# Patient Record
Sex: MALE | Race: WHITE | NOT HISPANIC OR LATINO | Employment: OTHER | URBAN - METROPOLITAN AREA
[De-identification: names, ages, dates, MRNs, and addresses within clinical notes are randomized per-mention and may not be internally consistent; named-entity substitution may affect disease eponyms.]

---

## 2017-03-29 ENCOUNTER — TELEPHONE (OUTPATIENT)
Dept: PODIATRY | Facility: CLINIC | Age: 42
End: 2017-03-29

## 2017-03-29 NOTE — TELEPHONE ENCOUNTER
Spoke to patient who called to request new prescription for topical antifungal medication. Educated on current prescription for efinaconazole 10% with 11 refills sent in by Dr. Munoz on 12/7/16. Verbalized understanding and ended call pleasantly.

## 2017-03-29 NOTE — TELEPHONE ENCOUNTER
----- Message from Letty Webb sent at 3/29/2017  9:57 AM CDT -----  Contact: pt  Pt states needs a medication refill-for toenail fungus..[phone] (please notify when sent)           .  [facility] [place] [address]. - [place] - [address] [place], [place] [address] [address]  Phone: [phone] Fax: [phone]

## 2017-05-09 ENCOUNTER — OFFICE VISIT (OUTPATIENT)
Dept: INTERNAL MEDICINE | Facility: CLINIC | Age: 42
End: 2017-05-09
Payer: COMMERCIAL

## 2017-05-09 VITALS
DIASTOLIC BLOOD PRESSURE: 74 MMHG | OXYGEN SATURATION: 98 % | BODY MASS INDEX: 36.08 KG/M2 | WEIGHT: 272.25 LBS | TEMPERATURE: 96 F | HEIGHT: 73 IN | HEART RATE: 61 BPM | SYSTOLIC BLOOD PRESSURE: 132 MMHG

## 2017-05-09 DIAGNOSIS — J02.9 PHARYNGITIS, UNSPECIFIED ETIOLOGY: ICD-10-CM

## 2017-05-09 DIAGNOSIS — B35.9 TINEA: Primary | ICD-10-CM

## 2017-05-09 LAB — DEPRECATED S PYO AG THROAT QL EIA: NEGATIVE

## 2017-05-09 PROCEDURE — 99214 OFFICE O/P EST MOD 30 MIN: CPT | Mod: S$GLB,,, | Performed by: PEDIATRICS

## 2017-05-09 PROCEDURE — 87880 STREP A ASSAY W/OPTIC: CPT | Mod: PO

## 2017-05-09 PROCEDURE — 3075F SYST BP GE 130 - 139MM HG: CPT | Mod: S$GLB,,, | Performed by: PEDIATRICS

## 2017-05-09 PROCEDURE — 99999 PR PBB SHADOW E&M-EST. PATIENT-LVL III: CPT | Mod: PBBFAC,,, | Performed by: PEDIATRICS

## 2017-05-09 PROCEDURE — 3078F DIAST BP <80 MM HG: CPT | Mod: S$GLB,,, | Performed by: PEDIATRICS

## 2017-05-09 PROCEDURE — 1160F RVW MEDS BY RX/DR IN RCRD: CPT | Mod: S$GLB,,, | Performed by: PEDIATRICS

## 2017-05-09 PROCEDURE — 87081 CULTURE SCREEN ONLY: CPT

## 2017-05-09 RX ORDER — ECONAZOLE NITRATE 10 MG/G
CREAM TOPICAL 2 TIMES DAILY
Qty: 85 G | Refills: 0 | Status: SHIPPED | OUTPATIENT
Start: 2017-05-09 | End: 2017-08-25

## 2017-05-09 RX ORDER — DESONIDE 0.5 MG/ML
LOTION TOPICAL 2 TIMES DAILY
Qty: 120 ML | Refills: 0 | Status: SHIPPED | OUTPATIENT
Start: 2017-05-09 | End: 2017-07-07 | Stop reason: SDUPTHER

## 2017-05-09 RX ORDER — AZITHROMYCIN 250 MG/1
TABLET, FILM COATED ORAL
Qty: 6 TABLET | Refills: 0 | Status: SHIPPED | OUTPATIENT
Start: 2017-05-09 | End: 2017-08-25 | Stop reason: SDUPTHER

## 2017-05-09 NOTE — MR AVS SNAPSHOT
[facility]  [address] [facility] [place] [address]  Phone: [phone]  Fax: [phone]                  Pradeep Aranda   2017 7:40 AM   Office Visit    Description:  Male : 1975   Provider:  HEAVEN Munoz Jr., MD   Department:  [facility]           Reason for Visit     Recurrent Skin Infections     Sore Throat           Diagnoses this Visit        Comments    Tinea    -  Primary     Pharyngitis, unspecified etiology                To Do List           Future Appointments        Provider Department Dept Phone    2017 7:00 AM HEAVEN Munoz Jr., MD [facility] [phone]      Goals (5 Years of Data)     None      Follow-Up and Disposition     Follow-up and Disposition History       These Medications        Disp Refills Start End    econazole nitrate 1 % cream 85 g 0 2017     Apply topically 2 (two) times daily. - Topical (Top)    Pharmacy: [facility] [address] #: [phone]       desonide (DESOWEN) 0.05 % lotion 120 mL 0 2017    Apply topically 2 (two) times daily. - Topical (Top)    Pharmacy: [facility] [address] #: [phone]       azithromycin (Z-QUANG) 250 MG tablet 6 tablet 0 2017     Take 2 tablets by mouth on day 1; Take 1 tablet by mouth on days 2-5    Pharmacy: [facility] . [address] Ph #: [phone]         [facility]     [facility] -  Assistance  Unless otherwise directed by your provider, please contact [facility], our nurse care line that is available for  assistance.     Registered nurses in the [facility] provide: appointment scheduling, clinical advisement, health education, and other advisory services.  Call: [phone] (toll free)               Medications           Message regarding Medications     Verify  "the changes and/or additions to your medication regime listed below are the same as discussed with your clinician today.  If any of these changes or additions are incorrect, please notify your healthcare provider.        START taking these NEW medications        Refills    econazole nitrate 1 % cream 0    Sig: Apply topically 2 (two) times daily.    Class: Normal    Route: Topical (Top)    desonide (DESOWEN) 0.05 % lotion 0    Sig: Apply topically 2 (two) times daily.    Class: Normal    Route: Topical (Top)    azithromycin (Z-QUANG) 250 MG tablet 0    Sig: Take 2 tablets by mouth on day 1; Take 1 tablet by mouth on days 2-5    Class: Normal      STOP taking these medications     tadalafil (CIALIS) 20 MG Tab Take 1 tablet (20 mg total) by mouth once daily.           Verify that the below list of medications is an accurate representation of the medications you are currently taking.  If none reported, the list may be blank. If incorrect, please contact your healthcare provider. Carry this list with you in case of emergency.           Current Medications     azithromycin (Z-QUANG) 250 MG tablet Take 2 tablets by mouth on day 1; Take 1 tablet by mouth on days 2-5    baclofen (LIORESAL) 10 MG tablet Take 1 tablet (10 mg total) by mouth nightly as needed.    desonide (DESOWEN) 0.05 % lotion Apply topically 2 (two) times daily.    econazole nitrate 1 % cream Apply topically 2 (two) times daily.    efinaconazole 10 % Clemencia Apply 1 application topically once daily.    gabapentin (NEURONTIN) 300 MG capsule Take 1 capsule (300 mg total) by mouth 2 (two) times daily.    hydrocodone-acetaminophen 7.5-325mg (NORCO) 7.5-325 mg per tablet Take 1 tablet by mouth every 6 (six) hours as needed for Pain.           Clinical Reference Information           Your Vitals Were     BP Pulse Temp Height    132/74 (BP Location: Left arm, Patient Position: Sitting, BP Method: Manual) 61 96.4 °F (35.8 °C) (Tympanic) 6' 1" (1.854 m)    Weight SpO2 BMI "    123.5 kg (272 lb 4.3 oz) 98% 35.92 kg/m2      Blood Pressure          Most Recent Value    BP  132/74      Allergies as of 5/9/2017     Penicillins      Immunizations Administered on Date of Encounter - 5/9/2017     None      Orders Placed During Today's Visit     Future Labs/Procedures Expected by Expires    Throat Screen, Rapid  As directed 5/16/2017      Language Assistance Services     ATTENTION: Language assistance services are available, free of charge. Please call [phone].      ATENCIÓN: Si habla prasanth, tiene a perkins disposición servicios gratuitos de asistencia lingüística. Llame al [phone].     CHÚ Ý: N?u b?n nói Ti?ng Vi?t, có các d?ch v? h? tr? ngôn ng? mi?n phí dành cho b?n. G?i s? [phone].         [facility] complies with applicable Federal civil rights laws and does not discriminate on the basis of race, color, national origin, age, disability, or sex.

## 2017-05-09 NOTE — PROGRESS NOTES
Subjective:       Patient ID: Pradeep Aranda is a 41 y.o. male.    Chief Complaint: Recurrent Skin Infections and Sore Throat    HPI Comments: [place] [employer] [place] cold weather and outdoors. He had been involved in hiking. He has had over 1 month of rash on feet and also groin. No pruritic symptoms. Using tinactin. with slight. Flew in last night and has ST.    Sore Throat    This is a new problem. Episode onset: This am woke up with it. Neither side of throat is experiencing more pain than the other. There has been no fever. Pertinent negatives include no abdominal pain, congestion, coughing, diarrhea, ear pain, headaches, hoarse voice, neck pain, shortness of breath, swollen glands, trouble swallowing or vomiting. Associated symptoms comments: Just [place] from out of [place].. He has had no exposure to strep or mono.     Review of Systems   Constitutional: Negative for fever and unexpected weight change.   HENT: Positive for sore throat. Negative for congestion, ear pain, hoarse voice, rhinorrhea and trouble swallowing.    Eyes: Negative for discharge and redness.   Respiratory: Negative for cough, shortness of breath and wheezing.    Cardiovascular: Negative for chest pain, palpitations and leg swelling.   Gastrointestinal: Negative for abdominal pain, constipation, diarrhea and vomiting.   Genitourinary: Negative for decreased urine volume and difficulty urinating.   Musculoskeletal: Negative for arthralgias, joint swelling and neck pain.   Skin: Positive for rash. Negative for wound.   Neurological: Negative for syncope and headaches.   Psychiatric/Behavioral: Negative for behavioral problems and sleep disturbance.       Objective:      Physical Exam   Constitutional: He is oriented to person, place, and time. He appears well-developed and well-nourished. No distress.   HENT:   Right Ear: External ear normal.   Left Ear: External ear normal.   Nose: Nose normal.    Mouth/Throat: No oropharyngeal exudate (red swollen pharynx.).   Neck: Normal range of motion. Neck supple. No JVD present.   Lymphadenopathy:     He has no cervical adenopathy.   Neurological: He is alert and oriented to person, place, and time.   Skin: Rash (hyperpigmented and dry peeling skin of feet. Toenail fungus much better. Groin with 4 areas of hypopigmented skin.) noted.   Psychiatric: He has a normal mood and affect. His behavior is normal. Judgment and thought content normal.       Assessment:       1. Tinea    2. Pharyngitis, unspecified etiology        Plan:       Tinea  -     econazole nitrate 1 % cream; Apply topically 2 (two) times daily.  Dispense: 85 g; Refill: 0  -     desonide (DESOWEN) 0.05 % lotion; Apply topically 2 (two) times daily.  Dispense: 120 mL; Refill: 0    Pharyngitis, unspecified etiology    If positive for strep use zpack. O/w salt water gargles and tylenol. If rash does not get better, see derm for possible vitiligo.

## 2017-05-10 ENCOUNTER — TELEPHONE (OUTPATIENT)
Dept: INTERNAL MEDICINE | Facility: CLINIC | Age: 42
End: 2017-05-10

## 2017-05-10 NOTE — TELEPHONE ENCOUNTER
----- Message from Leticia Valadez MA sent at 5/10/2017 12:46 PM CDT -----      ----- Message -----     From: Myriam Quiroga     Sent: 5/10/2017  12:34 PM       To: Félix Comer Staff    Patient was given a prescription for Jublia and it cost $1,100.00.  He said the last time he was given something to get it with and it made the cost a lot less.   He wants to know if he can get that again?   Call him at [phone].                                              [place]

## 2017-05-11 LAB — BACTERIA THROAT CULT: NORMAL

## 2017-05-29 ENCOUNTER — TELEPHONE (OUTPATIENT)
Dept: PHYSICAL MEDICINE AND REHAB | Facility: CLINIC | Age: 42
End: 2017-05-29

## 2017-05-29 NOTE — TELEPHONE ENCOUNTER
----- Message from Arpita Faulkner sent at 5/29/2017  7:51 AM CDT -----  Contact: Patient  Patient called and stated he had to cancel due to insurance and can't get in touch with them today because it's a holiday.    He can be contacted at [phone].    Thanks,  Arpita

## 2017-07-07 DIAGNOSIS — B35.9 TINEA: ICD-10-CM

## 2017-07-10 RX ORDER — DESONIDE 0.5 MG/ML
LOTION TOPICAL
Qty: 118 ML | Refills: 2 | Status: SHIPPED | OUTPATIENT
Start: 2017-07-10 | End: 2018-11-15

## 2017-08-25 ENCOUNTER — OFFICE VISIT (OUTPATIENT)
Dept: FAMILY MEDICINE | Facility: CLINIC | Age: 42
End: 2017-08-25
Payer: COMMERCIAL

## 2017-08-25 VITALS
RESPIRATION RATE: 16 BRPM | HEIGHT: 72 IN | BODY MASS INDEX: 36.13 KG/M2 | TEMPERATURE: 98 F | HEART RATE: 78 BPM | OXYGEN SATURATION: 98 % | WEIGHT: 266.75 LBS | SYSTOLIC BLOOD PRESSURE: 110 MMHG | DIASTOLIC BLOOD PRESSURE: 69 MMHG

## 2017-08-25 DIAGNOSIS — M47.816 OSTEOARTHRITIS OF LUMBAR SPINE, UNSPECIFIED SPINAL OSTEOARTHRITIS COMPLICATION STATUS: ICD-10-CM

## 2017-08-25 DIAGNOSIS — L91.8 SKIN TAG: Primary | ICD-10-CM

## 2017-08-25 DIAGNOSIS — B35.9 TINEA: ICD-10-CM

## 2017-08-25 DIAGNOSIS — Z87.09: ICD-10-CM

## 2017-08-25 DIAGNOSIS — B36.0 TINEA VERSICOLOR: ICD-10-CM

## 2017-08-25 PROCEDURE — 3008F BODY MASS INDEX DOCD: CPT | Mod: S$GLB,,, | Performed by: FAMILY MEDICINE

## 2017-08-25 PROCEDURE — 3074F SYST BP LT 130 MM HG: CPT | Mod: S$GLB,,, | Performed by: FAMILY MEDICINE

## 2017-08-25 PROCEDURE — 3078F DIAST BP <80 MM HG: CPT | Mod: S$GLB,,, | Performed by: FAMILY MEDICINE

## 2017-08-25 PROCEDURE — 99999 PR PBB SHADOW E&M-EST. PATIENT-LVL III: CPT | Mod: PBBFAC,,, | Performed by: FAMILY MEDICINE

## 2017-08-25 PROCEDURE — 99214 OFFICE O/P EST MOD 30 MIN: CPT | Mod: S$GLB,,, | Performed by: FAMILY MEDICINE

## 2017-08-25 RX ORDER — ECONAZOLE NITRATE 10 MG/G
CREAM TOPICAL 2 TIMES DAILY
Qty: 85 G | Refills: 0
Start: 2017-08-25 | End: 2018-11-15

## 2017-08-25 RX ORDER — AZITHROMYCIN 250 MG/1
TABLET, FILM COATED ORAL
Qty: 6 TABLET | Refills: 0 | Status: SHIPPED | OUTPATIENT
Start: 2017-08-25 | End: 2017-11-22 | Stop reason: SDUPTHER

## 2017-08-25 NOTE — PROGRESS NOTES
Subjective:      Patient ID: Pradeep rAanda is a 42 y.o. male.    Chief Complaint: Skin Tags      Past Medical History:   Diagnosis Date    Back pain     DDD (degenerative disc disease), lumbar     Hoffa's fat pad disease     Kidney stone on left side      Past Surgical History:   Procedure Laterality Date    COLONOSCOPY N/A 1/26/2016    Procedure: COLONOSCOPY;  Surgeon: Chance Ríos MD;  Location: [facility];  Service: Endoscopy;  Laterality: N/A;    KNEE ARTHROSCOPY Bilateral 05/19/2016    none      REFRACTIVE SURGERY Bilateral 2006     Family History   Problem Relation Age of Onset    Cataracts Father     Cancer Sister     Diabetes Mellitus      Hypertension      Allergies      Stroke      Cataracts Maternal Grandmother     Glaucoma Maternal Grandmother      Social History     Social History    Marital status: Single     Spouse name: N/A    Number of children: N/A    Years of education: N/A     Occupational History    Not on file.     Social History Main Topics    Smoking status: Never Smoker    Smokeless tobacco: Never Used    Alcohol use Yes      Comment: occasionally    Drug use: No    Sexual activity: Yes     Partners: Female     Other Topics Concern    Not on file     Social History Narrative    No narrative on file       Current Outpatient Prescriptions:     azithromycin (Z-QUANG) 250 MG tablet, Take 2 tablets by mouth on day 1; Take 1 tablet by mouth on days 2-5, Disp: 6 tablet, Rfl: 0    baclofen (LIORESAL) 10 MG tablet, Take 1 tablet (10 mg total) by mouth nightly as needed., Disp: 30 tablet, Rfl: 1    desonide (DESOWEN) 0.05 % lotion, apply to affected area twice a day, Disp: 118 mL, Rfl: 2    gabapentin (NEURONTIN) 300 MG capsule, Take 1 capsule (300 mg total) by mouth 2 (two) times daily., Disp: 60 capsule, Rfl: 5    econazole nitrate 1 % cream, Apply topically 2 (two) times daily., Disp: 85 g, Rfl: 0  Review of patient's allergies indicates:   Allergen  Reactions    Penicillins      Other reaction(s): Anaphylaxis       Review of Systems   Constitutional: Negative for chills and fever.   Respiratory: Negative for shortness of breath and wheezing.    Cardiovascular: Negative for chest pain and palpitations.   Gastrointestinal: Negative for abdominal pain and blood in stool.   Skin: Positive for rash. Negative for wound.     Skin Tags   Associated symptoms include a rash. Pertinent negatives include no abdominal pain, chest pain, chills or fever.     2 skin tags came up over the past couple of months.  Concerning him b/c of family history of cancer.  He also has intermittent back pain but this has been better controlled lately.  Rash on bilateral feet for the past few months - has been using tinactin, but not helping.    Objective:   /69 (BP Location: Right arm, Patient Position: Sitting, BP Method: Large (Manual))   Pulse 78   Temp 97.7 °F (36.5 °C) (Tympanic)   Resp 16   Ht 6' (1.829 m)   Wt 121 kg (266 lb 12.1 oz)   SpO2 98%   BMI 36.18 kg/m²      Physical Exam   Constitutional: He is oriented to person, place, and time. He is cooperative. No distress.   Eyes: Conjunctivae and EOM are normal.   Cardiovascular: Normal rate, regular rhythm and normal heart sounds.    Pulmonary/Chest: Effort normal and breath sounds normal.   Musculoskeletal: He exhibits no edema.   Neurological: He is alert and oriented to person, place, and time. No cranial nerve deficit. Coordination and gait normal.   Skin: Skin is warm, dry and intact. No rash noted. He is not diaphoretic. No erythema.   Tattoo across upper chest and bilateral arms     0.4 cm flat base uniform color rounded papule on left inner arm in tattoo; he had another similar area on left arm that resolved - no longer there    Multiple brown macules around 0.5 - 1 cm along lateral edge and heel of bilateral feet    Psychiatric: He has a normal mood and affect. His speech is normal and behavior is normal.    Nursing note and vitals reviewed.          Assessment:       1. Skin tag    2. Tinea pedis of both feet    3. Tinea    4. H/O tonsillitis    5. Osteoarthritis of lumbar spine, unspecified spinal osteoarthritis complication status            Plan:         Skin tag  Comments:  Reassured benign skin lesion.    Tinea pedis of both feet  Comments:  Can use econzaole cream twice daily can take up to 4-6 weeks to improve at times.    Orders:  -     econazole nitrate 1 % cream; Apply topically 2 (two) times daily.  Dispense: 85 g; Refill: 0    Tinea  -     econazole nitrate 1 % cream; Apply topically 2 (two) times daily.  Dispense: 85 g; Refill: 0    H/O tonsillitis  Comments:  Zpack filled - he travels a lot/overseas.  Would like to have rx just in case he needs while traveling.  Orders:  -     azithromycin (Z-QUANG) 250 MG tablet; Take 2 tablets by mouth on day 1; Take 1 tablet by mouth on days 2-5  Dispense: 6 tablet; Refill: 0    Osteoarthritis of lumbar spine, unspecified spinal osteoarthritis complication status  Comments:  Chronic. Encouraged yoga and daily walking.  Baclofen and gabapentin if needed, but has not needed medication in a long time.        Patient Care Team:  HEAVEN Munoz Jr., MD as PCP - General (Pediatrics)    Return if symptoms worsen or fail to improve.

## 2017-08-25 NOTE — PATIENT INSTRUCTIONS
Fungal Skin Infection (Tinea)  A fungal infection is when too much fungus grows on or in the body. Fungus normally lives on the skin in small amounts and does not cause harm. But when too much grows on the skin, it causes an infection. This is also known as tinea. Fungal skin infections are common and not often serious.  The infection often starts as a small red area the size of a pea. The skin may turn dry and flaky. The area may itch. As the fungus grows, it spreads out in a red [place]. Because of how it looks, fungal skin infection is often called ringworm, but it is not caused by a worm. Fungal skin infections can occur on many parts of the body. They can grow on the head, chest, arms, or legs. They can occur on the buttocks. On the feet, fungal infection is known as athletes foot. It causes itchy, sometimes painful sores between the toes and the bottom or sides of the feet. In the groin, the rash is called jock itch.  People with weakened immune systems can get a fungal infection more easily. This includes people with diabetes or HIV, or who are being treated for cancer. In these cases, the fungal infection can spread and cause severe illness. Fungal infections are also more common in people who are obese.  In most cases, treatment is done with antifungal cream or ointment. If the infection is on your scalp, you may take oral medication. In some cases, a tiny piece of the skin (biopsy) may be taken. This is so it can be tested in a lab.  Common fungal infections are treated with creams on the skin or oral medicine.  Home care  Follow all instructions when using antifungal cream or ointment on your skin. The health care provider may advise using cornstarch powder to keep your skin dry or petroleum jelly to provide a barrier.  General care:  · If you were prescribed an oral medicine, read the patient information. Talk with the health care provider about the risks and side effects.  · Let your skin dry  completely after bathing. Carefully dry your feet and between your toes.  · Dress in loose cotton clothing.  · Dont scratch the affected area. This can delay healing and may spread the infection. It can also cause a bacterial infection.  · Keep your skin clean, but dont wash the skin too much. This can irritate your skin.  · Keep in mind that it may take a week before the fungus starts to go away. It can take 2 to 4 weeks to fully clear. To prevent it from coming back, use the medicine until the rash is all gone.  Follow-up care  Follow up with your health care provider if the rash does not get better after 10 days of treatment. Also follow up if the rash spreads to other parts of your body.  When to seek medical advice  Call your health care provider right away if any of these occur:  · Fever of 100.4°F (38°C) or higher  · Redness or swelling that gets worse  · Pain that gets worse  · Foul-smelling fluid leaking from the skin  Date Last Reviewed: 7/23/2014  © 6874-6647 [employer]. [address] [place] [address], [place] [address]. All rights reserved. This information is not intended as a substitute for professional medical care. Always follow your healthcare professional's instructions.

## 2017-09-20 ENCOUNTER — TELEPHONE (OUTPATIENT)
Dept: INTERNAL MEDICINE | Facility: CLINIC | Age: 42
End: 2017-09-20

## 2017-09-20 ENCOUNTER — TELEPHONE (OUTPATIENT)
Dept: PHYSICAL MEDICINE AND REHAB | Facility: CLINIC | Age: 42
End: 2017-09-20

## 2017-09-20 NOTE — TELEPHONE ENCOUNTER
----- Message from Samanthafrantz Carranza sent at 9/20/2017  2:32 PM CDT -----  Contact: pt  States he's going [place], he needs a refill on hydrocodone. States he's leaving on Sunday. Pt uses     [facility] [place] [address]. - [place] [address] [address]  Phone: [phone] Fax: [phone]    Please call pt at [phone]. Thank you

## 2017-09-20 NOTE — TELEPHONE ENCOUNTER
----- Message from Mady Roberto sent at 9/20/2017 11:39 AM CDT -----  Patient requesting a Rx refill for Hydrocodone. States he will be leaving to go [place] on [place].     Pt use..  [facility] [place] [address]. - [place] [facility] [address] [address] [facility] [place] [address]  Phone: [phone] Fax: [phone]    Please adv/call [phone].//thanks. cw

## 2017-09-20 NOTE — TELEPHONE ENCOUNTER
Returned call to pt regarding [place] refill. Says he's been having issues w/his back, and he's going out of the country on Monday. I advised him that Dr. Munoz is out of the office until Tuesday, and is unable to fill his prescription at this time. I also advised that with him getting the script so sparingly, the other providers are not likely to write the script for him, but he's more that welcome to schedule an appointment if he'd like. He declined, and stated that he'd call Dr. Baltazar to get his refill.///rlt

## 2017-11-08 ENCOUNTER — TELEPHONE (OUTPATIENT)
Dept: INTERNAL MEDICINE | Facility: CLINIC | Age: 42
End: 2017-11-08

## 2017-11-08 NOTE — TELEPHONE ENCOUNTER
----- Message from Linda Grullon sent at 11/8/2017  4:56 PM CST -----  Contact: Patient   Patient needs to be seen for pain medication refill for his back, Please call him at [phone]. He stated that he needs to see Dr. Munoz.    Thanks  td

## 2017-11-08 NOTE — TELEPHONE ENCOUNTER
Pt requested refill of Norco.  Notified pt that he will need appt with Dr. Munoz.  Pt verbalized understanding and scheduled appt for 11/14/17 and added to wait list.

## 2017-11-14 ENCOUNTER — OFFICE VISIT (OUTPATIENT)
Dept: INTERNAL MEDICINE | Facility: CLINIC | Age: 42
End: 2017-11-14
Payer: COMMERCIAL

## 2017-11-14 VITALS
WEIGHT: 281.31 LBS | OXYGEN SATURATION: 98 % | DIASTOLIC BLOOD PRESSURE: 88 MMHG | HEART RATE: 66 BPM | BODY MASS INDEX: 37.28 KG/M2 | HEIGHT: 73 IN | SYSTOLIC BLOOD PRESSURE: 138 MMHG | TEMPERATURE: 97 F

## 2017-11-14 DIAGNOSIS — M47.816 OSTEOARTHRITIS OF LUMBAR SPINE, UNSPECIFIED SPINAL OSTEOARTHRITIS COMPLICATION STATUS: Primary | ICD-10-CM

## 2017-11-14 DIAGNOSIS — E29.1 HYPOGONADISM IN MALE: ICD-10-CM

## 2017-11-14 PROCEDURE — 99999 PR PBB SHADOW E&M-EST. PATIENT-LVL III: CPT | Mod: PBBFAC,,, | Performed by: PEDIATRICS

## 2017-11-14 PROCEDURE — 99214 OFFICE O/P EST MOD 30 MIN: CPT | Mod: 25,S$GLB,, | Performed by: PEDIATRICS

## 2017-11-14 PROCEDURE — 90471 IMMUNIZATION ADMIN: CPT | Mod: S$GLB,,, | Performed by: PEDIATRICS

## 2017-11-14 PROCEDURE — 90686 IIV4 VACC NO PRSV 0.5 ML IM: CPT | Mod: S$GLB,,, | Performed by: PEDIATRICS

## 2017-11-14 RX ORDER — HYDROCODONE BITARTRATE AND ACETAMINOPHEN 7.5; 325 MG/1; MG/1
1 TABLET ORAL EVERY 6 HOURS PRN
Qty: 30 TABLET | Refills: 0 | Status: SHIPPED | OUTPATIENT
Start: 2017-11-14 | End: 2018-02-12 | Stop reason: SDUPTHER

## 2017-11-14 NOTE — PROGRESS NOTES
Subjective:       Patient ID: Pradeep Aranda is a 42 y.o. male.    Chief Complaint: Medication Refill    He has had flare of lumbar back pain. He has been taking aleve with some help. Neurontin and baclofen did not help. He has been traveling(got food poisoning in [place]) and has not been able to keep up with back exercises. Has just restarted. Incidentally started on depot for hypogonadism. No sciatic pain. No bowel or bladder dysfunction.      Medication Refill   Associated symptoms include arthralgias. Pertinent negatives include no chest pain, congestion, coughing, fever, headaches, joint swelling, rash or vomiting.     Review of Systems   Constitutional: Negative for fever and unexpected weight change.   HENT: Negative for congestion and rhinorrhea.    Eyes: Negative for discharge and redness.   Respiratory: Negative for cough and wheezing.    Cardiovascular: Negative for chest pain, palpitations and leg swelling.   Gastrointestinal: Negative for constipation, diarrhea and vomiting.   Endocrine: Negative for cold intolerance, polydipsia, polyphagia and polyuria.   Genitourinary: Negative for decreased urine volume and difficulty urinating.   Musculoskeletal: Positive for arthralgias and back pain. Negative for gait problem and joint swelling.   Skin: Negative for rash and wound.   Neurological: Negative for syncope and headaches.   Psychiatric/Behavioral: Negative for behavioral problems and sleep disturbance.       Objective:      Physical Exam   Constitutional: He is oriented to person, place, and time. He appears well-developed and well-nourished. No distress.   Neck: No JVD present.   Cardiovascular: Normal rate, regular rhythm and normal heart sounds.    No murmur heard.  Pulmonary/Chest: Effort normal and breath sounds normal. He has no wheezes.   Abdominal: Soft. He exhibits no distension and no mass. There is no tenderness. There is no rebound and no guarding.   Musculoskeletal: He exhibits no  edema.   Good rom of back, no areas of tenderness. No SLR.   Lymphadenopathy:     He has no cervical adenopathy.   Neurological: He is alert and oriented to person, place, and time. He displays normal reflexes. No cranial nerve deficit or sensory deficit. He exhibits normal muscle tone. Coordination normal.   Psychiatric: He has a normal mood and affect. His behavior is normal. Judgment and thought content normal.       Assessment:       1. Osteoarthritis of lumbar spine, unspecified spinal osteoarthritis complication status    2. Hypogonadism in male        Plan:       Osteoarthritis of lumbar spine, unspecified spinal osteoarthritis complication status    Hypogonadism in male    Other orders  -     hydrocodone-acetaminophen 7.5-325mg (NORCO) 7.5-325 mg per tablet; Take 1 tablet by mouth every 6 (six) hours as needed for Pain.  Dispense: 30 tablet; Refill: 0    Continue NSAIDs. Restart PT(he will restart at home). Norco limited for severe flares. [place]  reviewed showed no use since 5/16. F/u as needed.

## 2017-11-22 ENCOUNTER — TELEPHONE (OUTPATIENT)
Dept: INTERNAL MEDICINE | Facility: CLINIC | Age: 42
End: 2017-11-22

## 2017-11-22 DIAGNOSIS — Z87.09: ICD-10-CM

## 2017-11-22 RX ORDER — AZITHROMYCIN 250 MG/1
TABLET, FILM COATED ORAL
Qty: 6 TABLET | Refills: 0 | Status: SHIPPED | OUTPATIENT
Start: 2017-11-22 | End: 2018-01-26

## 2017-11-22 RX ORDER — DICYCLOMINE HYDROCHLORIDE 10 MG/1
10 CAPSULE ORAL
Qty: 40 CAPSULE | Refills: 0 | Status: SHIPPED | OUTPATIENT
Start: 2017-11-22 | End: 2017-12-02

## 2017-11-22 NOTE — TELEPHONE ENCOUNTER
----- Message from Polo Lundberg LPN sent at 11/22/2017 10:40 AM CST -----      ----- Message -----  From: Tania [place]  Sent: 11/22/2017  10:23 AM  To: Tammy Soto Jr Staff    states that he will be out of town next wk, dyclonine(?) & zpak...[phone] (home)     [facility] [place] [address] [facility] [place] [facility] [address] [place] [address]  Phone: [phone] Fax: [phone]

## 2017-11-29 ENCOUNTER — OFFICE VISIT (OUTPATIENT)
Dept: INTERNAL MEDICINE | Facility: CLINIC | Age: 42
End: 2017-11-29
Payer: COMMERCIAL

## 2017-11-29 VITALS
OXYGEN SATURATION: 96 % | SYSTOLIC BLOOD PRESSURE: 122 MMHG | HEART RATE: 72 BPM | RESPIRATION RATE: 16 BRPM | TEMPERATURE: 98 F | HEIGHT: 73 IN | WEIGHT: 281.06 LBS | DIASTOLIC BLOOD PRESSURE: 84 MMHG | BODY MASS INDEX: 37.25 KG/M2

## 2017-11-29 DIAGNOSIS — J03.90 TONSILLITIS WITH EXUDATE: Primary | ICD-10-CM

## 2017-11-29 LAB — DEPRECATED S PYO AG THROAT QL EIA: NEGATIVE

## 2017-11-29 PROCEDURE — 99213 OFFICE O/P EST LOW 20 MIN: CPT | Mod: S$GLB,,, | Performed by: PHYSICIAN ASSISTANT

## 2017-11-29 PROCEDURE — 87081 CULTURE SCREEN ONLY: CPT

## 2017-11-29 PROCEDURE — 87147 CULTURE TYPE IMMUNOLOGIC: CPT

## 2017-11-29 PROCEDURE — 87880 STREP A ASSAY W/OPTIC: CPT | Mod: PO

## 2017-11-29 PROCEDURE — 99999 PR PBB SHADOW E&M-EST. PATIENT-LVL V: CPT | Mod: PBBFAC,,, | Performed by: PHYSICIAN ASSISTANT

## 2017-11-29 RX ORDER — TESTOSTERONE CYPIONATE 200 MG/ML
1 INJECTION, SOLUTION INTRAMUSCULAR WEEKLY
Refills: 0 | COMMUNITY
Start: 2017-11-07 | End: 2018-11-15

## 2017-11-29 RX ORDER — AZITHROMYCIN 500 MG/1
500 TABLET, FILM COATED ORAL DAILY
Qty: 7 TABLET | Refills: 0 | Status: SHIPPED | OUTPATIENT
Start: 2017-11-29 | End: 2017-12-06
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
## 2017-11-30 LAB
BACTERIA THROAT CULT: NORMAL
BACTERIA THROAT CULT: NORMAL

## 2018-01-26 ENCOUNTER — OFFICE VISIT (OUTPATIENT)
Dept: INTERNAL MEDICINE | Facility: CLINIC | Age: 43
End: 2018-01-26
Payer: COMMERCIAL

## 2018-01-26 VITALS
WEIGHT: 283.94 LBS | BODY MASS INDEX: 37.63 KG/M2 | HEIGHT: 73 IN | DIASTOLIC BLOOD PRESSURE: 88 MMHG | TEMPERATURE: 96 F | HEART RATE: 79 BPM | SYSTOLIC BLOOD PRESSURE: 130 MMHG

## 2018-01-26 DIAGNOSIS — R68.89 FLU-LIKE SYMPTOMS: Primary | ICD-10-CM

## 2018-01-26 DIAGNOSIS — R06.83 SNORING: ICD-10-CM

## 2018-01-26 DIAGNOSIS — G47.09 OTHER INSOMNIA: ICD-10-CM

## 2018-01-26 DIAGNOSIS — J35.8 TONSILLAR ERYTHEMA: ICD-10-CM

## 2018-01-26 LAB
FLUAV AG SPEC QL IA: NEGATIVE
FLUBV AG SPEC QL IA: NEGATIVE
SPECIMEN SOURCE: NORMAL

## 2018-01-26 PROCEDURE — 87400 INFLUENZA A/B EACH AG IA: CPT | Mod: PO

## 2018-01-26 PROCEDURE — 99999 PR PBB SHADOW E&M-EST. PATIENT-LVL IV: CPT | Mod: PBBFAC,,, | Performed by: NURSE PRACTITIONER

## 2018-01-26 PROCEDURE — 99214 OFFICE O/P EST MOD 30 MIN: CPT | Mod: S$GLB,,, | Performed by: NURSE PRACTITIONER

## 2018-01-26 RX ORDER — AZITHROMYCIN 250 MG/1
TABLET, FILM COATED ORAL
Qty: 6 TABLET | Refills: 0 | Status: SHIPPED | OUTPATIENT
Start: 2018-01-26 | End: 2018-11-15

## 2018-01-26 NOTE — PROGRESS NOTES
"Subjective:       Patient ID: Pradeep Aranda is a 42 y.o. male.    Chief Complaint: Sore Throat; Cough; and Generalized Body Aches    Pt presents with c/o of sore throat, inflamed tonsils, and body aches x 1 day. He reports hx of tonsillitis and attempting to "stay on top of preventing infections". He denies fever, sob, cp, neck pain/stiffness, headaches, n/v/d, abd pain or any other acute symptoms.       He also reports long term insomnia. This is ongoing for years. He reports difficulty falling asleep. He has tried melatonin which did not provide much relief. He is concerned about possible sleep apnea. He does snore. He denies morning headaches, daytime drowsiness.         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  desonide (DESOWEN) 0.05 % lotion, apply to affected area twice a day  econazole nitrate 1 % cream, Apply topically 2 (two) times daily.  azithromycin (Z-QUANG) 250 MG tablet, As per packet instructions  gabapentin (NEURONTIN) 300 MG capsule, Take 1 capsule (300 mg total) by mouth 2 (two) times daily.  hydrocodone-acetaminophen 7.5-325mg (NORCO) 7.5-325 mg per tablet, Take 1 tablet by mouth every 6 (six) hours as needed for Pain.  naproxen-diphenhydramine (ALEVE PM) 220-25 mg Tab, Take by mouth as needed.  testosterone cypionate (DEPOTESTOTERONE CYPIONATE) 200 mg/mL injection, Inject 1 mL into the muscle once a week.    No current facility-administered medications for this visit.         Review of Systems   Constitutional: Negative for chills, fatigue and fever.   HENT: Positive for sore throat. Negative for congestion, ear pain, postnasal drip, rhinorrhea, sinus pressure and sneezing.    Eyes: Negative for photophobia, pain and visual disturbance. "   Respiratory: Negative for cough, chest tightness and shortness of breath.    Cardiovascular: Negative for chest pain, palpitations and leg swelling.   Gastrointestinal: Negative for abdominal pain, constipation, diarrhea, nausea and vomiting.   Genitourinary: Negative for dysuria and frequency.   Musculoskeletal: Positive for myalgias. Negative for arthralgias.   Neurological: Negative for dizziness, light-headedness and headaches.   Psychiatric/Behavioral: Negative for sleep disturbance.       Objective:      Physical Exam   Constitutional: He is oriented to person, place, and time. He appears well-developed and well-nourished.   HENT:   Head: Normocephalic and atraumatic.   Right Ear: Tympanic membrane normal.   Left Ear: Tympanic membrane normal.   Nose: Mucosal edema present.   Mouth/Throat: Uvula is midline and mucous membranes are normal. Posterior oropharyngeal erythema present. Tonsils are 3+ on the right. Tonsils are 3+ on the left.   Neck: Normal range of motion. Neck supple.   Cardiovascular: Normal rate, regular rhythm and normal heart sounds.    Pulmonary/Chest: Effort normal and breath sounds normal.   Musculoskeletal: Normal range of motion.   Neurological: He is alert and oriented to person, place, and time.   Skin: Skin is warm and dry.   Psychiatric: He has a normal mood and affect.       Assessment:       1. Flu-like symptoms    2. Tonsillar erythema    3. Other insomnia    4. Snoring    5. BMI 37.0-37.9, adult        Plan:   Flu-like symptoms  -     Influenza antigen Nasopharyngeal Swab; Future; Expected date: 01/26/2018    Tonsillar erythema    Other insomnia  -     Ambulatory referral to Pulmonology    Snoring  -     Ambulatory referral to Pulmonology    BMI 37.0-37.9, adult  -     Ambulatory referral to Pulmonology    Other orders  -     azithromycin (Z-QUANG) 250 MG tablet; As per packet instructions  Dispense: 6 tablet; Refill: 0      Please start the Zpack prescribed today if your symptoms  worsen over the weekend. For now please try nonmedicinal interventions as we discussed (lozenges, warm salt gargles etc). Call us with any worsening of symptoms post start of antibiotics if they are warranted.  Referral to pulmonology for sleep study

## 2018-01-30 ENCOUNTER — TELEPHONE (OUTPATIENT)
Dept: INTERNAL MEDICINE | Facility: CLINIC | Age: 43
End: 2018-01-30

## 2018-01-30 RX ORDER — METHYLPREDNISOLONE 4 MG/1
TABLET ORAL
Qty: 1 PACKAGE | Refills: 0 | Status: SHIPPED | OUTPATIENT
Start: 2018-01-30 | End: 2018-11-15

## 2018-01-30 RX ORDER — MOXIFLOXACIN HYDROCHLORIDE 400 MG/1
400 TABLET ORAL DAILY
Qty: 7 TABLET | Refills: 0 | Status: SHIPPED | OUTPATIENT
Start: 2018-01-30 | End: 2018-02-06

## 2018-01-30 NOTE — TELEPHONE ENCOUNTER
Still could be influenza as the swab is inaccurate, he has to treat symptoms, tamiflu won't work at this stage. I case bacterial and not responsive to zpak, new rx for avelox and medrol dose pack sent.

## 2018-01-30 NOTE — TELEPHONE ENCOUNTER
Returned pt call. Advised pt that Dr. Munoz said the test results may be inaccurate and it still may be the flu. Informed pt antibiotic and medrol dose pack had been called into the pharmacy. Pt verbalized understanding.

## 2018-01-30 NOTE — TELEPHONE ENCOUNTER
----- Message from Florina Bee MA sent at 1/30/2018 11:16 AM CST -----  Contact: PT   States fermin prescribed Zpac. Flu swab was done. Came back negative. States he is coughing now and a lot of congestion. Please advise.  ----- Message -----  From: Haydee Arnold  Sent: 1/30/2018  10:26 AM  To: Tammy Soto Jr Staff    PT states he came in to see Np Fermin on 1/26/2018 and is not feeling better with medication she gave to him. Pt also has used Muscux DM and Thera Flu. Needs to know if there is anything else he can take. .[phone] (home)

## 2018-02-13 RX ORDER — HYDROCODONE BITARTRATE AND ACETAMINOPHEN 7.5; 325 MG/1; MG/1
1 TABLET ORAL EVERY 6 HOURS PRN
Qty: 30 TABLET | Refills: 0 | Status: SHIPPED | OUTPATIENT
Start: 2018-02-13 | End: 2018-11-15

## 2018-10-04 ENCOUNTER — OFFICE VISIT (OUTPATIENT)
Dept: INTERNAL MEDICINE | Facility: CLINIC | Age: 43
End: 2018-10-04

## 2018-10-04 ENCOUNTER — HOSPITAL ENCOUNTER (OUTPATIENT)
Dept: RADIOLOGY | Facility: HOSPITAL | Age: 43
Discharge: HOME | End: 2018-10-04
Attending: PEDIATRICS

## 2018-10-04 ENCOUNTER — TELEPHONE (OUTPATIENT)
Dept: PODIATRY | Facility: CLINIC | Age: 43
End: 2018-10-04

## 2018-10-04 VITALS
HEART RATE: 77 BPM | TEMPERATURE: 98 F | SYSTOLIC BLOOD PRESSURE: 124 MMHG | OXYGEN SATURATION: 98 % | HEIGHT: 73 IN | WEIGHT: 272.5 LBS | DIASTOLIC BLOOD PRESSURE: 68 MMHG | BODY MASS INDEX: 36.11 KG/M2

## 2018-10-04 DIAGNOSIS — S96.912A STRAIN OF LEFT ANKLE, INITIAL ENCOUNTER: Primary | ICD-10-CM

## 2018-10-04 DIAGNOSIS — S96.912A STRAIN OF LEFT ANKLE, INITIAL ENCOUNTER: ICD-10-CM

## 2018-10-04 PROCEDURE — 90686 IIV4 VACC NO PRSV 0.5 ML IM: CPT | Mod: PBBFAC,PO

## 2018-10-04 PROCEDURE — 73610 X-RAY EXAM OF ANKLE: CPT | Mod: 26,LT,, | Performed by: RADIOLOGY

## 2018-10-04 PROCEDURE — 99999 PR PBB SHADOW E&M-EST. PATIENT-LVL IV: CPT | Mod: PBBFAC,,, | Performed by: PEDIATRICS

## 2018-10-04 PROCEDURE — 99212 OFFICE O/P EST SF 10 MIN: CPT | Mod: S$PBB,,, | Performed by: PEDIATRICS

## 2018-10-04 PROCEDURE — 99214 OFFICE O/P EST MOD 30 MIN: CPT | Mod: PBBFAC,PO,25 | Performed by: PEDIATRICS

## 2018-10-04 PROCEDURE — 73610 X-RAY EXAM OF ANKLE: CPT | Mod: TC,FY,PO,LT

## 2018-10-04 NOTE — TELEPHONE ENCOUNTER
----- Message from Jenna Lundy LPN sent at 10/4/2018 10:45 AM CDT -----  Good morning. Pt was seen in the clinic with Dr. Munoz for an ankle strain and is trying to get a podiatry appt this week if you have anything available. Please give pt a call. Thank you.     CECY Lundy LPN

## 2018-10-04 NOTE — PROGRESS NOTES
Subjective:       Patient ID: Pradeep Aranda is a 43 y.o. male.    Chief Complaint: Edema (swollen ankles (left mainly))    He reports about 1 year of intermittent swelling of dorsum left foot and ankle. Has pain associated and mild stiffness. Does not give out. Works out daily. No trauma.      Review of Systems   Constitutional: Negative for fever and unexpected weight change.   HENT: Negative for congestion and rhinorrhea.    Eyes: Negative for discharge and redness.   Respiratory: Negative for cough and wheezing.    Cardiovascular: Negative for chest pain, palpitations and leg swelling.   Gastrointestinal: Negative for constipation, diarrhea and vomiting.   Genitourinary: Negative for decreased urine volume and difficulty urinating.   Musculoskeletal: Positive for arthralgias. Negative for joint swelling.   Skin: Negative for rash and wound.   Neurological: Negative for syncope and headaches.   Psychiatric/Behavioral: Negative for behavioral problems and sleep disturbance.       Objective:      Physical Exam   Constitutional: He is oriented to person, place, and time. He appears well-developed and well-nourished. No distress.   Musculoskeletal:   His left ankle and foot are swollen on dorsum. ROM is good but mildly limited on medial inversion. DP is intact    Neurological: He is alert and oriented to person, place, and time. No cranial nerve deficit. Coordination normal.   Psychiatric: He has a normal mood and affect. His behavior is normal. Judgment and thought content normal.       Assessment:       1. Strain of left ankle, initial encounter        Plan:       Strain of left ankle, initial encounter  -     X-Ray Ankle Complete Left; Future; Expected date: 10/04/2018  -     Ambulatory referral to Podiatry    Other orders  -     Influenza - Quadrivalent (3 years & older) (PF)

## 2018-11-14 NOTE — PROGRESS NOTES
Pradeep Aranda  11/15/2018  [id number]    NATALIIA Munoz Jr, MD  Patient Care Team:  HEAVEN Munoz Jr., MD as PCP - General (Pediatrics)  Radha Holliday LPN as Care Coordinator (Internal Medicine)  Has the patient seen any provider outside of the [facility] network since the last visit? (no). If yes, HIPPA forms completed and records requested.        Visit Type:an urgent visit for a new problem    Chief Complaint:  Chief Complaint   Patient presents with    Wrist Injury     right side happened about a week and a half two weeks ago. Patient can't remember what exactly happened. right wrist he thought it was just the thumb but depending on how its position the pain increases    Arm Pain     left arm between the elbow and below it. been going on for a few months. sometimes its so bad he can't pickup a gallon of milk.       History of Present Illness:    43 year old, patient of Dr. Munoz, here for acute wrist injury, started with pain behind thumb. He reports didn't not any trauma, felt a pop back in his wrist area, but then the pain got worse in the thumb. Pain can wake him up in night. He reports pain at tip of thumb, down. Denies swelling.  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    He reports issues sleeping. He has tried Melatonin and Benadryl.    Weight loss, reports still working out, but unclear why he is losing weight. No fever, no sweats.  No diarrhea or change in stool.     History:  Past Medical History:   Diagnosis Date    Back pain     DDD (degenerative disc disease), lumbar     Hoffa's fat pad disease     Kidney stone on left side      Past Surgical History:   Procedure Laterality Date    ARTHROSCOPY-KNEE W/ CHONDROPLASTY Bilateral 5/19/2016    [facility]    ARTHROSCOPY-KNEE-BILATERAL Bilateral 5/19/2016    [facility]    COLONOSCOPY N/A 1/26/2016    Procedure: COLONOSCOPY;  Surgeon: Chance GALLEGOS  MD Khushbu;  Location: [facility] ENDO;  Service: Endoscopy;  Laterality: N/A;    COLONOSCOPY N/A 1/26/2016    Per[facility] by [facility] R[facility] at [facility]    ESOPHAGOGASTRODUODENOSCOPY (EGD) N/A 1/26/2016    [facility]    KNEE ARTHROSCOPY Bilateral 05/19/2016    LATERAL RELEASE Right 5/19/2016    [facility] OR    none      REFRACTIVE SURGERY Bilateral 2006    SYNOVECTOMY-KNEE Bilateral 5/19/2016    [facility]     Family History   Problem Relation Age of Onset    Cataracts Father     Cancer Sister     Diabetes Mellitus Unknown     Hypertension Unknown     Allergies Unknown     Stroke Unknown     Cataracts Maternal Grandmother     Glaucoma Maternal Grandmother      Social History     Socioeconomic History    Marital status: Single     Spouse name: Not on file    Number of children: Not on file    Years of education: Not on file    Highest education level: Not on file   Social Needs    Financial resource strain: Not on file    Food insecurity - worry: Not on file    Food insecurity - inability: Not on file    Transportation needs - medical: Not on file    Transportation needs - non-medical: Not on file   Occupational History    Not on file   Tobacco Use    Smoking status: Never Smoker    Smokeless tobacco: Never Used   Substance and Sexual Activity    Alcohol use: Yes     Comment: occasionally    Drug use: No    Sexual activity: Yes     Partners: Female   Other Topics Concern    Not on file   Social History Narrative    Not on file     Patient Active Problem List   Diagnosis    DDD (degenerative disc disease), lumbar    Calcium oxalate renal stones    Inflammation of sacroiliac joint    Anxiety    Insomnia    DJD (degenerative joint disease), lumbar    Iron deficiency anemia    Rotator cuff impingement syndrome of right shoulder    Arm weakness-rotator cuff weakness    Hoffa's fat  pad disease    Meniscal cyst    Chondromalacia of both patellae    Patellar tendonitis of both knees    Renal stones    Patellar tracking disorder of right knee    Pterygium of eye    Toenail fungus    Hypogonadism in male     Review of patient's allergies indicates:   Allergen Reactions    Penicillins      Other reaction(s): Anaphylaxis       The following were reviewed at this visit: active problem list, medication list, allergies, family history, social history, and health maintenance.    Medications:  Current Outpatient Medications on File Prior to Visit   Medication Sig Dispense Refill    naproxen sodium (ANAPROX) 220 MG tablet Take 220 mg by mouth every 12 (twelve) hours.      [DISCONTINUED] azithromycin (Z-QUANG) 250 MG tablet As per packet instructions 6 tablet 0    [DISCONTINUED] baclofen (LIORESAL) 10 MG tablet Take 1 tablet (10 mg total) by mouth nightly as needed. 30 tablet 1    [DISCONTINUED] desonide (DESOWEN) 0.05 % lotion apply to affected area twice a day 118 mL 2    [DISCONTINUED] econazole nitrate 1 % cream Apply topically 2 (two) times daily. 85 g 0    [DISCONTINUED] gabapentin (NEURONTIN) 300 MG capsule Take 1 capsule (300 mg total) by mouth 2 (two) times daily. 60 capsule 5    [DISCONTINUED] hydrocodone-acetaminophen 7.5-325mg (NORCO) 7.5-325 mg per tablet Take 1 tablet by mouth every 6 (six) hours as needed for Pain. 30 tablet 0    [DISCONTINUED] methylPREDNISolone (MEDROL DOSEPACK) 4 mg tablet use as directed 1 Package 0    [DISCONTINUED] naproxen-diphenhydramine (ALEVE PM) 220-25 mg Tab Take by mouth as needed.      [DISCONTINUED] testosterone cypionate (DEPOTESTOTERONE CYPIONATE) 200 mg/mL injection Inject 1 mL into the muscle once a week.  0     No current facility-administered medications on file prior to visit.        Medications have been reviewed and reconciled with patient at this visit.  Barriers to medications present (no)    Adverse reactions to current medications  (no)    Over the counter medications reviewed (Yes ), and if needed added to active Medication list at this visit.     Exam:  Wt Readings from Last 3 Encounters:   11/15/18 119.1 kg (262 lb 9.1 oz)   10/04/18 123.6 kg (272 lb 7.8 oz)   01/26/18 128.8 kg (283 lb 15.2 oz)     Temp Readings from Last 3 Encounters:   11/15/18 97.7 °F (36.5 °C) (Tympanic)   10/04/18 98.1 °F (36.7 °C) (Tympanic)   01/26/18 96.3 °F (35.7 °C) (Tympanic)     BP Readings from Last 3 Encounters:   11/15/18 126/84   10/04/18 124/68   01/26/18 130/88     Pulse Readings from Last 3 Encounters:   11/15/18 75   10/04/18 77   01/26/18 79     Body mass index is 34.64 kg/m².      Review of Systems   Musculoskeletal: Positive for joint pain.   Psychiatric/Behavioral: The patient has insomnia.      Physical Exam   Constitutional: He appears well-developed and well-nourished.   HENT:   Head: Normocephalic.   Eyes: EOM are normal. Pupils are equal, round, and reactive to light.   Neck: Normal range of motion. Neck supple.   Cardiovascular: Normal rate.   Pulmonary/Chest: Effort normal.   Musculoskeletal: He exhibits tenderness.        Left forearm: He exhibits tenderness. He exhibits no bony tenderness, no swelling and no edema.        Arms:       Right hand: He exhibits tenderness. He exhibits normal range of motion and normal capillary refill.        Left hand: He exhibits normal range of motion, no tenderness, no bony tenderness and normal capillary refill.        Hands:      Laboratory Reviewed ({N/A)  Lab Results   Component Value Date    WBC 6.06 12/07/2016    HGB 16.1 12/07/2016    HCT 46.8 12/07/2016     12/07/2016    CHOL 229 (H) 12/07/2016    TRIG 108 12/07/2016    HDL 51 12/07/2016    ALT 17 06/20/2016    AST 18 06/20/2016     12/07/2016    K 4.1 12/07/2016     12/07/2016    CREATININE 1.2 12/07/2016    BUN 17 12/07/2016    CO2 28 12/07/2016       Pradeep was seen today for wrist injury and arm pain.    Diagnoses and all orders  for this visit:    Thumb pain, right   Possible tendonitis.   Discussed tx.   Will try Splint and NSAID   Otherwise, ortho for injection    Left tennis elbow   Rest    Brace    Insomnia, unspecified type   Natural tx, needs sleep study   Limited due to self pay, but discussed importance of getting study completed.     Weight loss  -     CBC auto differential; Future  -     Comprehensive metabolic panel; Future  -     TSH; Future   Can consider COLON or Scan after if labs are abnl.                Care Plan/Goals: Reviewed  (N/A)  Goals     None          Follow up: No Follow-up on file.    After visit summary was printed and given to patient upon discharge today.  Patient goals and care plan are included in After Visit Summary.

## 2018-11-15 ENCOUNTER — OFFICE VISIT (OUTPATIENT)
Dept: INTERNAL MEDICINE | Facility: CLINIC | Age: 43
End: 2018-11-15

## 2018-11-15 ENCOUNTER — OFFICE VISIT (OUTPATIENT)
Dept: PODIATRY | Facility: CLINIC | Age: 43
End: 2018-11-15

## 2018-11-15 ENCOUNTER — LAB VISIT (OUTPATIENT)
Dept: LAB | Facility: HOSPITAL | Age: 43
End: 2018-11-15
Attending: FAMILY MEDICINE

## 2018-11-15 VITALS
DIASTOLIC BLOOD PRESSURE: 77 MMHG | WEIGHT: 263.13 LBS | RESPIRATION RATE: 16 BRPM | BODY MASS INDEX: 34.87 KG/M2 | HEART RATE: 64 BPM | HEIGHT: 73 IN | SYSTOLIC BLOOD PRESSURE: 122 MMHG

## 2018-11-15 VITALS
WEIGHT: 262.56 LBS | OXYGEN SATURATION: 98 % | HEIGHT: 73 IN | SYSTOLIC BLOOD PRESSURE: 126 MMHG | DIASTOLIC BLOOD PRESSURE: 84 MMHG | TEMPERATURE: 98 F | BODY MASS INDEX: 34.8 KG/M2 | HEART RATE: 75 BPM

## 2018-11-15 DIAGNOSIS — M77.12 LEFT TENNIS ELBOW: ICD-10-CM

## 2018-11-15 DIAGNOSIS — R63.4 WEIGHT LOSS: ICD-10-CM

## 2018-11-15 DIAGNOSIS — G47.00 INSOMNIA, UNSPECIFIED TYPE: ICD-10-CM

## 2018-11-15 DIAGNOSIS — M25.572 PAIN IN LEFT ANKLE AND JOINTS OF LEFT FOOT: ICD-10-CM

## 2018-11-15 DIAGNOSIS — M77.52 TENDINITIS OF LEFT ANKLE: Primary | ICD-10-CM

## 2018-11-15 DIAGNOSIS — M79.644 THUMB PAIN, RIGHT: Primary | ICD-10-CM

## 2018-11-15 LAB
ALBUMIN SERPL BCP-MCNC: 4 G/DL
ALP SERPL-CCNC: 47 U/L
ALT SERPL W/O P-5'-P-CCNC: 56 U/L
ANION GAP SERPL CALC-SCNC: 7 MMOL/L
AST SERPL-CCNC: 41 U/L
BASOPHILS # BLD AUTO: 0.04 K/UL
BASOPHILS NFR BLD: 0.8 %
BILIRUB SERPL-MCNC: 0.7 MG/DL
BUN SERPL-MCNC: 22 MG/DL
CALCIUM SERPL-MCNC: 9.3 MG/DL
CHLORIDE SERPL-SCNC: 104 MMOL/L
CO2 SERPL-SCNC: 29 MMOL/L
CREAT SERPL-MCNC: 1.4 MG/DL
DIFFERENTIAL METHOD: NORMAL
EOSINOPHIL # BLD AUTO: 0.3 K/UL
EOSINOPHIL NFR BLD: 5.1 %
ERYTHROCYTE [DISTWIDTH] IN BLOOD BY AUTOMATED COUNT: 14.4 %
EST. GFR  (AFRICAN AMERICAN): >60 ML/MIN/1.73 M^2
EST. GFR  (NON AFRICAN AMERICAN): >60 ML/MIN/1.73 M^2
GLUCOSE SERPL-MCNC: 98 MG/DL
HCT VFR BLD AUTO: 51.8 %
HGB BLD-MCNC: 17.2 G/DL
IMM GRANULOCYTES # BLD AUTO: 0.02 K/UL
IMM GRANULOCYTES NFR BLD AUTO: 0.4 %
LYMPHOCYTES # BLD AUTO: 1.6 K/UL
LYMPHOCYTES NFR BLD: 29.9 %
MCH RBC QN AUTO: 28.9 PG
MCHC RBC AUTO-ENTMCNC: 33.2 G/DL
MCV RBC AUTO: 87 FL
MONOCYTES # BLD AUTO: 0.6 K/UL
MONOCYTES NFR BLD: 10.4 %
NEUTROPHILS # BLD AUTO: 2.8 K/UL
NEUTROPHILS NFR BLD: 53.4 %
NRBC BLD-RTO: 0 /100 WBC
PLATELET # BLD AUTO: 167 K/UL
PMV BLD AUTO: 9.8 FL
POTASSIUM SERPL-SCNC: 4.9 MMOL/L
PROT SERPL-MCNC: 7.5 G/DL
RBC # BLD AUTO: 5.95 M/UL
SODIUM SERPL-SCNC: 140 MMOL/L
TSH SERPL DL<=0.005 MIU/L-ACNC: 3.65 UIU/ML
WBC # BLD AUTO: 5.29 K/UL

## 2018-11-15 PROCEDURE — 99999 PR PBB SHADOW E&M-EST. PATIENT-LVL III: CPT | Mod: PBBFAC,,, | Performed by: FAMILY MEDICINE

## 2018-11-15 PROCEDURE — 99999 PR PBB SHADOW E&M-EST. PATIENT-LVL III: CPT | Mod: PBBFAC,,, | Performed by: PODIATRIST

## 2018-11-15 PROCEDURE — 99213 OFFICE O/P EST LOW 20 MIN: CPT | Mod: PBBFAC,27 | Performed by: FAMILY MEDICINE

## 2018-11-15 PROCEDURE — 80053 COMPREHEN METABOLIC PANEL: CPT

## 2018-11-15 PROCEDURE — 85025 COMPLETE CBC W/AUTO DIFF WBC: CPT

## 2018-11-15 PROCEDURE — 36415 COLL VENOUS BLD VENIPUNCTURE: CPT

## 2018-11-15 PROCEDURE — 99213 OFFICE O/P EST LOW 20 MIN: CPT | Mod: S$PBB,,, | Performed by: PODIATRIST

## 2018-11-15 PROCEDURE — 99213 OFFICE O/P EST LOW 20 MIN: CPT | Mod: S$PBB,,, | Performed by: FAMILY MEDICINE

## 2018-11-15 PROCEDURE — 99213 OFFICE O/P EST LOW 20 MIN: CPT | Mod: PBBFAC | Performed by: PODIATRIST

## 2018-11-15 PROCEDURE — 84443 ASSAY THYROID STIM HORMONE: CPT

## 2018-11-15 RX ORDER — NAPROXEN SODIUM 220 MG
220 TABLET ORAL
COMMUNITY
End: 2018-12-24 | Stop reason: ALTCHOICE

## 2018-11-15 NOTE — PROGRESS NOTES
Subjective:       Patient ID: Pradeep Aranad is a 43 y.o. male.    Chief Complaint: Ankle Pain (c/o left ankle pain and swelling, rates pain 8/10, non-diabetic Pt, PCP Dr. Munoz.)      HPI: Pradeep Aranda presents to the clinic today for evaluation concerning stated moderate pains to the left foot/ankle at the anterior aspect. Patient states pains are approx.  8/10 when they do occur.  Patient is a  and an avid , and states his symptoms are mostly with doing the types of activities. Pains are described as moderate and achy. Pains have been present for duration of several months, approximately 2. Patient states the pains are exacerbated with walking and standing and prolonged activities. States no prior medical evaluation by a MD//DPM/NP. States occasional NSAIDs. Trauma is not stated. Patient's Primary Care Provider is NATALIIA Munoz Jr, MD.     Review of patient's allergies indicates:   Allergen Reactions    Penicillins      Other reaction(s): Anaphylaxis       Past Medical History:   Diagnosis Date    Back pain     DDD (degenerative disc disease), lumbar     Hoffa's fat pad disease     Kidney stone on left side        Family History   Problem Relation Age of Onset    Cataracts Father     Cancer Sister     Diabetes Mellitus Unknown     Hypertension Unknown     Allergies Unknown     Stroke Unknown     Cataracts Maternal Grandmother     Glaucoma Maternal Grandmother        Social History     Socioeconomic History    Marital status: Single     Spouse name: Not on file    Number of children: Not on file    Years of education: Not on file    Highest education level: Not on file   Social Needs    Financial resource strain: Not on file    Food insecurity - worry: Not on file    Food insecurity - inability: Not on file    Transportation needs - medical: Not on file    Transportation needs - non-medical: Not on file   Occupational History    Not on file   Tobacco Use     Smoking status: Never Smoker    Smokeless tobacco: Never Used   Substance and Sexual Activity    Alcohol use: Yes     Comment: occasionally    Drug use: No    Sexual activity: Yes     Partners: Female   Other Topics Concern    Not on file   Social History Narrative    Not on file       Past Surgical History:   Procedure Laterality Date    ARTHROSCOPY-KNEE W/ CHONDROPLASTY Bilateral 5/19/2016    Performed by Bright [facility] at [facility] OR    ARTHROSCOPY-KNEE-BILATERAL Bilateral 5/19/2016    Performed by [facility] at [facility] OR    COLONOSCOPY N/A 1/26/2016    Procedure: COLONOSCOPY;  Surgeon: Chance Ríos MD;  Location: [facility] ENDO;  Service: Endoscopy;  Laterality: N/A;    COLONOSCOPY N/A 1/26/2016    Performed by [facility] at [facility] ENDO    ESOPHAGOGASTRODUODENOSCOPY (EGD) N/A 1/26/2016    Performed by [facility] at [facility] ENDO    KNEE ARTHROSCOPY Bilateral 05/19/2016    LATERAL RELEASE Right 5/19/2016    Performed [facility] at [facility] OR    none      REFRACTIVE SURGERY Bilateral 2006    SYNOVECTOMY-KNEE Bilateral 5/19/2016    Performed by Bright [facility] at [facility] OR       Review of Systems   Constitutional: Negative for chills, fatigue and fever.   HENT: Negative for hearing loss.    Eyes: Negative for photophobia and visual disturbance.   Respiratory: Negative for cough, chest tightness, shortness of breath and wheezing.    Cardiovascular: Negative for chest pain and palpitations.   Gastrointestinal: Negative for constipation, diarrhea, nausea and vomiting.   Endocrine: Negative for cold intolerance and heat intolerance.   Genitourinary: Negative for flank pain.   Musculoskeletal: Positive for gait problem. Negative for neck pain and neck stiffness.   Skin: Negative for wound.   Neurological: Negative for light-headedness and headaches.   Psychiatric/Behavioral: Negative for sleep disturbance.         Objective:   /77 (BP Location: Left arm,  "Patient Position: Sitting, BP Method: Large (Automatic))   Pulse 64   Resp 16   Ht 6' 1" (1.854 m)   Wt 119.3 kg (263 lb 1.9 oz)   BMI 34.71 kg/m²     X-Ray Ankle Complete Left  Narrative: EXAMINATION:  XR ANKLE COMPLETE 3 VIEW LEFT    CLINICAL HISTORY:  Strain of unspecified muscle and tendon at ankle and foot level, left foot, initial encounter    TECHNIQUE:  AP, lateral and oblique views of the left ankle were performed.    COMPARISON:  None    FINDINGS:  Small Achilles enthesophyte.  Ankle mortise is congruent.  No acute fracture, subluxation, or dislocation.  Soft tissues are normal  Impression: As above    Electronically signed by: Teto Sethi MD  Date:    10/04/2018  Time:    11:18      LOWER EXTREMITY PHYSICAL EXAMINATION  NEUROLOGY: Protective sensation is intact via 5.07 [employer] [place] monofilament. Proprioception is intact. Sensation to light touch is intact. Vibratory sensation is WNL.    DERMATOLOGY: Skin is supple, dry and intact. No ecchymosis is noted. No hypertrophic skin formation. No erythema or cellulitis is noted.    VASCULAR: On the left foot, the dorsalis pedis pulse is 2/4 and the posterior tibial pulse is 2/4. Capillary refill time is less than 3 seconds. Hair growth is present on the dorsum of the foot and at the digits. No rubor is present. Proximal to distal temperature is warm to warm.    ORTHOPEDIC: Manual Muscle Testing is 5/5 in all planes on the left, without pains, with and without resistance. No pains to palpation of the medial or lateral ankle ligaments. No discomfort to palpation of the posterior tibial tendon, peroneal tendon, or the Achilles tendon. Discomfort palpation along the extensor tendons at the anterior aspect the ankle joint.  Slight discomfort palpation of the anterolateral ankle gutter.  No discomfort palpation of the anterior medial ankle gutter.  No pain along the course of the Achilles tendon or upon its insertion.  No plantar fascial pain is noted. No " pedal edema is noted. No ankle edema is noted. No discomfort palpation of the anterior border of the tibia, or the medial or the lateral malleolus to suggest a stress fracture.  No pinpoint edema is noted. Rectus foot type is noted. Gait pattern is non-antalgic.    Assessment:     1. Tendinitis of left ankle    2. Pain in left ankle and joints of left foot          Plan:     Tendinitis of left ankle    Pain in left ankle and joints of left foot      Thorough discussion is had with the patient today, concerning the diagnosis, its etiology, and the treatment algorithm at present.  XRAYS are reviewed in detail with the patient. All questions and concerns regarding findings and its/their implications are outlined and discussed.  Patient may take over-the-counter Motrin 600mg PO TID or Motrin 800mg PO BID, for duration of 7 days, then as needed.  No need for ASO brace here.  No need for immobilization with a walking boot.  Please follow up in approximately 3 weeks if the pain is not resolved.  At that point time, may consider initiation of outpatient physical therapy versus MRI imaging.          Future Appointments   Date Time Provider Department Center   11/15/2018  3:20 PM SIDDHARTH PRASAD [facility] [place]

## 2018-11-19 ENCOUNTER — TELEPHONE (OUTPATIENT)
Dept: INTERNAL MEDICINE | Facility: CLINIC | Age: 43
End: 2018-11-19

## 2018-11-19 NOTE — TELEPHONE ENCOUNTER
Called pt to advise him that Dr. Munoz would like for him to come in for a f/u visit regarding weight loss. Appt scheduled for 12/19/18 @ 9:40a. Pt verbalized understanding of appt date and time.

## 2018-12-18 ENCOUNTER — PATIENT MESSAGE (OUTPATIENT)
Dept: INTERNAL MEDICINE | Facility: CLINIC | Age: 43
End: 2018-12-18

## 2018-12-20 ENCOUNTER — OFFICE VISIT (OUTPATIENT)
Dept: INTERNAL MEDICINE | Facility: CLINIC | Age: 43
End: 2018-12-20

## 2018-12-20 ENCOUNTER — TELEPHONE (OUTPATIENT)
Dept: ORTHOPEDICS | Facility: CLINIC | Age: 43
End: 2018-12-20

## 2018-12-20 ENCOUNTER — HOSPITAL ENCOUNTER (OUTPATIENT)
Dept: RADIOLOGY | Facility: HOSPITAL | Age: 43
Discharge: HOME | End: 2018-12-20
Attending: PEDIATRICS

## 2018-12-20 VITALS
HEIGHT: 73 IN | RESPIRATION RATE: 18 BRPM | SYSTOLIC BLOOD PRESSURE: 124 MMHG | WEIGHT: 262.63 LBS | HEART RATE: 84 BPM | TEMPERATURE: 98 F | DIASTOLIC BLOOD PRESSURE: 82 MMHG | BODY MASS INDEX: 34.81 KG/M2 | OXYGEN SATURATION: 97 %

## 2018-12-20 DIAGNOSIS — R63.4 WEIGHT LOSS: Primary | ICD-10-CM

## 2018-12-20 DIAGNOSIS — M77.12 LEFT TENNIS ELBOW: ICD-10-CM

## 2018-12-20 DIAGNOSIS — R63.4 WEIGHT LOSS: ICD-10-CM

## 2018-12-20 PROCEDURE — 71046 X-RAY EXAM CHEST 2 VIEWS: CPT | Mod: TC,FY,PO

## 2018-12-20 PROCEDURE — 71046 X-RAY EXAM CHEST 2 VIEWS: CPT | Mod: 26,,, | Performed by: RADIOLOGY

## 2018-12-20 PROCEDURE — 99213 OFFICE O/P EST LOW 20 MIN: CPT | Mod: PBBFAC,PO | Performed by: PEDIATRICS

## 2018-12-20 PROCEDURE — 99214 OFFICE O/P EST MOD 30 MIN: CPT | Mod: S$PBB,,, | Performed by: PEDIATRICS

## 2018-12-20 PROCEDURE — 99999 PR PBB SHADOW E&M-EST. PATIENT-LVL III: CPT | Mod: PBBFAC,,, | Performed by: PEDIATRICS

## 2018-12-20 NOTE — TELEPHONE ENCOUNTER
Patient is self pay and requests to see a provider in orthopedic department to address pain that he is experiencing in his wrist and elbow, non injury, chronic. Patient wanted appointment asap as he is flying out of the country on the 27th. Explained to patient that I would make the appointment and then speak to our provider and call financial services to determine the cost. Patient then informs me that he already talked to financial services but doesn't know who and was told that he can just pay a small amount when he arrives and can be billed for the rest. Spoke to  who states that patient has a almost $2,800 collection balance and a 250.00 current bad debt and that he would have to pay the full amount of $750.00 to be seen and did not tell the patient that. States that she called the patient to inform him that he would have to pay the $750.00 and that he told her that he isn't going to pay anything because the nurse already made my appointment. I called patient back to inform him that I will be cancelling his appointment and he can call back to schedule once he is able to pay the full amount. Patient then said to keep the appointment and that he has the money and can pay in full. Made sure that patient understands that the amount will need to be paid in full when he checks in to the  or he will not be seen.

## 2018-12-20 NOTE — PROGRESS NOTES
Subjective:       Patient ID: Pradeep Aranda is a 43 y.o. male.    Chief Complaint: No chief complaint on file.    His weight loss has stabilized. He is extensively working out and dieting. He is having no fever, night sweats, rashes, or other systemic symptoms. Has had hep vaccines and likely BCG. Protected heterosexual contact.  He also his having extensive right wrist pain, has trouble holding pistol(in security business) and left elbow pain- again working out.      Review of Systems   Constitutional: Negative for fever and unexpected weight change (no stable).   HENT: Negative for congestion and rhinorrhea.    Eyes: Negative for discharge and redness.   Respiratory: Negative for cough and wheezing.    Cardiovascular: Negative for chest pain, palpitations and leg swelling.   Gastrointestinal: Negative for abdominal pain, constipation, diarrhea and vomiting.   Endocrine: Negative for cold intolerance, heat intolerance, polydipsia, polyphagia and polyuria.   Genitourinary: Negative for decreased urine volume and difficulty urinating.   Musculoskeletal: Positive for arthralgias, back pain and myalgias. Negative for joint swelling.   Skin: Negative for rash and wound.   Neurological: Negative for syncope and headaches.   Psychiatric/Behavioral: Negative for behavioral problems and sleep disturbance.       Objective:      Physical Exam   Constitutional: He is oriented to person, place, and time. He appears well-developed and well-nourished. No distress.   Neck: No JVD present. No thyromegaly present.   Cardiovascular: Normal rate, regular rhythm and normal heart sounds.   No murmur heard.  Pulmonary/Chest: Effort normal and breath sounds normal. No respiratory distress. He has no wheezes. He has no rales.   Abdominal: Soft. He exhibits no distension and no mass. There is no tenderness. There is no guarding.   Musculoskeletal: He exhibits no edema.   right wrist with swelling and restricted movement, left tennis  elbow tenderness.   Lymphadenopathy:     He has no cervical adenopathy.   Neurological: He is alert and oriented to person, place, and time. No cranial nerve deficit. Coordination normal.   Skin: Capillary refill takes less than 2 seconds. No rash noted.   Psychiatric: He has a normal mood and affect. His behavior is normal. Judgment and thought content normal.       Assessment:       1. Weight loss    2. Left tennis elbow        Plan:       Weight loss  -     Hepatic function panel; Future; Expected date: 12/20/2018  -     HEPATITIS PANEL, ACUTE; Future; Expected date: 12/20/2018  -     HIV 1/2 Ag/Ab (4th Gen); Future; Expected date: 12/20/2018  -     Testosterone; Future; Expected date: 12/20/2018  -     X-Ray Chest PA And Lateral; Future; Expected date: 12/20/2018  -     Quantiferon Gold TB; Future; Expected date: 12/20/2018  -     Fecal Immunochemical Test (iFOBT); Future; Expected date: 12/20/2018    Left tennis elbow  -     Ambulatory referral to Orthopedics    I think his weight loss was lifestyle changes and has stabilized, await w/u for above to consider elevated LFTs(minimal). F/U based on symptoms.

## 2018-12-21 ENCOUNTER — PATIENT MESSAGE (OUTPATIENT)
Dept: ORTHOPEDICS | Facility: CLINIC | Age: 43
End: 2018-12-21

## 2018-12-21 ENCOUNTER — TELEPHONE (OUTPATIENT)
Dept: ORTHOPEDICS | Facility: CLINIC | Age: 43
End: 2018-12-21

## 2018-12-21 DIAGNOSIS — M25.531 RIGHT WRIST PAIN: Primary | ICD-10-CM

## 2018-12-21 DIAGNOSIS — M25.522 LEFT ELBOW PAIN: ICD-10-CM

## 2018-12-24 ENCOUNTER — HOSPITAL ENCOUNTER (OUTPATIENT)
Dept: RADIOLOGY | Facility: HOSPITAL | Age: 43
Discharge: HOME | End: 2018-12-24
Attending: PHYSICIAN ASSISTANT

## 2018-12-24 ENCOUNTER — OFFICE VISIT (OUTPATIENT)
Dept: ORTHOPEDICS | Facility: CLINIC | Age: 43
End: 2018-12-24

## 2018-12-24 VITALS
WEIGHT: 262.56 LBS | HEART RATE: 81 BPM | SYSTOLIC BLOOD PRESSURE: 128 MMHG | BODY MASS INDEX: 34.8 KG/M2 | DIASTOLIC BLOOD PRESSURE: 86 MMHG | RESPIRATION RATE: 12 BRPM | HEIGHT: 73 IN

## 2018-12-24 DIAGNOSIS — M65.4 DE QUERVAIN'S TENOSYNOVITIS, BILATERAL: Primary | ICD-10-CM

## 2018-12-24 DIAGNOSIS — M25.522 LEFT ELBOW PAIN: ICD-10-CM

## 2018-12-24 DIAGNOSIS — M25.531 RIGHT WRIST PAIN: ICD-10-CM

## 2018-12-24 DIAGNOSIS — R20.0 BILATERAL HAND NUMBNESS: ICD-10-CM

## 2018-12-24 PROCEDURE — 73080 X-RAY EXAM OF ELBOW: CPT | Mod: TC,FY,PO,LT

## 2018-12-24 PROCEDURE — 20550 NJX 1 TENDON SHEATH/LIGAMENT: CPT | Mod: 50,PBBFAC,PO | Performed by: PHYSICIAN ASSISTANT

## 2018-12-24 PROCEDURE — 99214 OFFICE O/P EST MOD 30 MIN: CPT | Mod: 25,S$PBB,, | Performed by: PHYSICIAN ASSISTANT

## 2018-12-24 PROCEDURE — 73080 X-RAY EXAM OF ELBOW: CPT | Mod: 26,LT,, | Performed by: RADIOLOGY

## 2018-12-24 PROCEDURE — 73110 X-RAY EXAM OF WRIST: CPT | Mod: 50,TC,FY,PO

## 2018-12-24 PROCEDURE — 20550 NJX 1 TENDON SHEATH/LIGAMENT: CPT | Mod: S$PBB,RT,, | Performed by: PHYSICIAN ASSISTANT

## 2018-12-24 PROCEDURE — 99213 OFFICE O/P EST LOW 20 MIN: CPT | Mod: PBBFAC,25,PO | Performed by: PHYSICIAN ASSISTANT

## 2018-12-24 PROCEDURE — 73110 X-RAY EXAM OF WRIST: CPT | Mod: 26,50,, | Performed by: RADIOLOGY

## 2018-12-24 PROCEDURE — 99999 PR PBB SHADOW E&M-EST. PATIENT-LVL III: CPT | Mod: PBBFAC,,, | Performed by: PHYSICIAN ASSISTANT

## 2018-12-24 RX ORDER — METHYLPREDNISOLONE ACETATE 40 MG/ML
40 INJECTION, SUSPENSION INTRA-ARTICULAR; INTRALESIONAL; INTRAMUSCULAR; SOFT TISSUE ONCE
Status: COMPLETED | OUTPATIENT
Start: 2018-12-24 | End: 2018-12-24

## 2018-12-24 RX ORDER — MELOXICAM 15 MG/1
15 TABLET ORAL DAILY
Qty: 30 TABLET | Refills: 1 | Status: SHIPPED | OUTPATIENT
Start: 2018-12-24 | End: 2019-07-10

## 2018-12-24 RX ADMIN — METHYLPREDNISOLONE ACETATE 40 MG: 40 INJECTION, SUSPENSION INTRALESIONAL; INTRAMUSCULAR; INTRASYNOVIAL; SOFT TISSUE at 09:12

## 2018-12-24 NOTE — PROGRESS NOTES
Subjective:      Patient ID: Pradeep Aranda is a 43 y.o. male.    Chief Complaint: Pain of the Right Hand and Pain of the Left Hand      HPI: Pradeep Aranda  is a 43 y.o. male who c/o Pain of the Right Hand and Pain of the Left Hand   for duration of through 4 months.  He is complaining of bilateral wrist pain as well as left elbow pain. The right wrist is by far his biggest problem.  It is 4/10 in severity.  Quality is sharp and aching.  He complains of associated numbness and tingling in his bilateral hands.  Alleviating factors in occlude warm water soaks.  He has had no relief with Aleve or ibuprofen.  Aggravating factors include gripping, grasping, bending the wrist. He is very active and spends a good portion of time in the gym doing weightlifting.  He leaves on Wednesday to go abroad.  He has spends most of his time in [place] where he works.    Past Medical History:   Diagnosis Date    Back pain     DDD (degenerative disc disease), lumbar     Hoffa's fat pad disease     Kidney stone on left side      Past Surgical History:   Procedure Laterality Date    ARTHROSCOPY-KNEE W/ CHONDROPLASTY Bilateral 5/19/2016    [facility]    ARTHROSCOPY-KNEE-BILATERAL Bilateral 5/19/2016    [facility] OR    COLONOSCOPY N/A 1/26/2016    [facility] at [facility] ENDO    ESOPHAGOGASTRODUODENOSCOPY (EGD) N/A 1/26/2016    [facility] at [facility] ENDO    KNEE ARTHROSCOPY Bilateral 05/19/2016    LATERAL RELEASE Right 5/19/2016    [facility] OR    none      REFRACTIVE SURGERY Bilateral 2006    SYNOVECTOMY-KNEE Bilateral 5/19/2016    [facility] OR     Family History   Problem Relation Age of Onset    Cataracts Father     Cancer Sister     Diabetes Mellitus Unknown     Hypertension Unknown     Allergies Unknown     Stroke Unknown     Cataracts  Maternal Grandmother     Glaucoma Maternal Grandmother      Social History     Socioeconomic History    Marital status: Single     Spouse name: Not on file    Number of children: Not on file    Years of education: Not on file    Highest education level: Not on file   Social Needs    Financial resource strain: Not on file    Food insecurity - worry: Not on file    Food insecurity - inability: Not on file    Transportation needs - medical: Not on file    Transportation needs - non-medical: Not on file   Occupational History    Not on file   Tobacco Use    Smoking status: Never Smoker    Smokeless tobacco: Never Used   Substance and Sexual Activity    Alcohol use: Yes     Comment: occasionally    Drug use: No    Sexual activity: Yes     Partners: Female   Other Topics Concern    Not on file   Social History Narrative    Not on file        Medication List           Accurate as of 12/24/18  9:22 AM. If you have any questions, ask your nurse or doctor.               START taking these medications    meloxicam 15 MG tablet  Commonly known as:  MOBIC  Take 1 tablet (15 mg total) by mouth once daily. Take with food.  Discontinue if you develop GI side effects.  Started by:  Cecily Garcia PA-C        STOP taking these medications    naproxen sodium 220 MG tablet  Commonly known as:  ANAPROX  Stopped by:  Cecily Garcia PA-C           Where to Get Your Medications      These medications were sent to [id number] - [place], [place] - [address] AT [facility] [address]  [address], [place] [address]    Phone:  [phone]   · meloxicam 15 MG tablet       Review of patient's allergies indicates:   Allergen Reactions    Penicillins      Other reaction(s): Anaphylaxis       Review of Systems   Constitution: Negative for fever.   Cardiovascular: Negative for chest pain.   Respiratory: Negative for cough and shortness of breath.    Skin: Negative for rash.   Musculoskeletal:  Positive for joint pain and joint swelling. Negative for stiffness.   Gastrointestinal: Negative for heartburn.   Neurological: Positive for numbness (bl hands). Negative for headaches.         Objective:        General    Nursing note and vitals reviewed.  Constitutional: He is oriented to person, place, and time. He appears well-developed and well-nourished.   HENT:   Head: Normocephalic and atraumatic.   Eyes: EOM are normal.   Cardiovascular: Normal rate and regular rhythm.    Pulmonary/Chest: Effort normal.   Abdominal: Soft.   Neurological: He is alert and oriented to person, place, and time.   Psychiatric: He has a normal mood and affect. His behavior is normal.             Right Hand/Wrist Exam     Inspection   Scars: Wrist - absent Hand -  absent  Effusion: Wrist - absent Hand -  absent  Bruising: Wrist - absent Hand -  absent  Deformity: Wrist - deformity Hand -  deformity    Tenderness   The patient is tender to palpation of the radial area.    Range of Motion     Wrist   Extension: normal   Flexion: normal   Pronation: normal   Supination: normal     Tests   Phalens sign: positive  Tinel's sign (median nerve): negative  Finkelstein's test: positive    Atrophy   Thenar:  negative  Hypothenar:  negative  Intrinsic:  negative  1st Dorsal Interosseous: negative    Other     Neuorologic Exam    Median Distribution: normal  Ulnar Distribution: normal  Radial Distribution: normal    Comments:  TTP 1st dorsal compartment      Left Hand/Wrist Exam     Inspection   Scars: Wrist - absent Hand -  absent  Effusion: Wrist - absent Hand -  absent  Bruising: Wrist - absent Hand -  absent  Deformity: Wrist - absent Hand -  absent    Tenderness   The patient is tender to palpation of the radial area.     Range of Motion     Wrist   Extension: normal   Flexion: normal   Pronation: normal   Supination: normal     Tests   Phalens sign: positive  Tinel's sign (median nerve): negative  Finkelstein's test:  positive    Atrophy  Thenar:  Negative  Hypothenar:  negative  Intrinsic: negative  1st Dorsal Interosseous:  negative    Other     Sensory Exam  Median Distribution: normal  Ulnar Distribution: normal  Radial Distribution: normal    Comments:  2+ radial pulse  TTP 1st dorsal compartment      Right Elbow Exam     Range of Motion   Extension: normal   Flexion: normal   Pronation: normal   Supination: normal     Tests   Tinel's sign (cubital tunnel): negative    Other   Sensation: normal      Left Elbow Exam     Range of Motion   Extension: normal   Flexion: normal   Pronation: normal   Supination: normal     Tests   Tinel's sign (cubital tunnel): negative    Other   Sensation: normal    Comments:  No TTP elbow          Muscle Strength   Right Upper Extremity   Wrist extension: 5/5/5   Wrist flexion: 5/5/5   : 5/5/5   Thumb - APB: 5/5  Pinch Mechanism: 5/5  Elbow Pronation:  5/5   Elbow Supination:  5/5   Elbow Extension: 5/5  Elbow Flexion: 5/5  Left Upper Extremity  Wrist extension: 5/5/5   Wrist flexion: 5/5/5   :  5/5/5   Thumb - APB: 5/5  Pinch Mechanism: 5/5  Elbow Pronation:  5/5   Elbow Supination:  5/5   Elbow Extension: 5/5  Elbow Flexion: 5/5    Vascular Exam       Capillary Refill  Right Hand: normal capillary refill  Left Hand: normal capillary refill            Xray:   Bilateral wrists from today images and report were reviewed today.  I agree with the radiologist's interpretation.  Right:  There is no radiographic evidence of acute osseous, articular, or soft tissue abnormality. Joint spaces are well preserved. There is mild negative ulnar variance.  Carpal alignment is otherwise within normal limits.  No erosive osseous changes demonstrated.  Left:  There is no radiographic evidence of acute osseous, articular, or soft tissue abnormality. Joint spaces are well preserved. There is mild negative ulnar variance.  Carpal alignment is otherwise within normal limits.  No erosive osseous changes  demonstrated.  Left elbow from today images and report were reviewed today.  I agree with the radiologist's interpretation.  There is no radiographic evidence of acute osseous, articular, or soft tissue abnormality.  Joint spaces are preserved.    Assessment:       Encounter Diagnoses   Name Primary?    De Quervain's tenosynovitis, bilateral Yes    Bilateral hand numbness     Left elbow pain           Plan:       Pradeep was seen today for pain and pain.    Diagnoses and all orders for this visit:    De Quervain's tenosynovitis, bilateral  -     methylPREDNISolone acetate injection 40 mg  -     methylPREDNISolone acetate injection 40 mg  -     meloxicam (MOBIC) 15 MG tablet; Take 1 tablet (15 mg total) by mouth once daily. Take with food.  Discontinue if you develop GI side effects.    Bilateral hand numbness  -     meloxicam (MOBIC) 15 MG tablet; Take 1 tablet (15 mg total) by mouth once daily. Take with food.  Discontinue if you develop GI side effects.    Left elbow pain  -     meloxicam (MOBIC) 15 MG tablet; Take 1 tablet (15 mg total) by mouth once daily. Take with food.  Discontinue if you develop GI side effects.    Pradeep is an established patient in the department who comes in today for new problems.  He has bilateral de Quervain tenosynovitis.  I also suspect he may have carpal tunnel syndrome.  We have discussed risks and benefits of an injection in the de Quervain tenosynovitis bilaterally.  He is in agreement with this.  He will get thumb spica splints over-the-counter and use as needed. I have given a prescription of meloxicam as above.  Ultimately, he needs a nerve conduction study to evaluate for carpal tunnel syndrome bilaterally.  However he leaves on Wednesday and I am not able to get him 1 prior to his departure.  He could have this done over there be evaluated for the carpal tunnels while abroad.  He follow up with me on an as-needed basis.  He verbalizes understanding and agrees.      Left  Dequervain's Injection Report:  After verbal consent was obtained for left wrist dequervain's injection, patient ID, site, and side were verified.  The  left wrist was sterilly prepped in the standard fashion.  A 22-gauge needle was introduced into left wrist without complication. The left wrist dequervain's tenosynovitis was then injected with 10 mg lidocaine plain and 40 mg depomedrol.  A sterile bandaid was applied.  The patient was informed to apply an ice pack approximately 10min once arriving home and not to do anything strenuous for 24hours.  He was instructed to call if there were any problems. The patient was discharged in stable condition.    Right Dequervain's Injection Report:  After verbal consent was obtained for right wrist dequervain's injection, patient ID, site, and side were verified.  The  right wrist was sterilly prepped in the standard fashion.  A 22-gauge needle was introduced into right wrist without complication. The rightt wrist dequervain's tenosynovitis was then injected with 10 mg lidocaine plain and 40 mg depomedrol.  A sterile bandaid was applied.  The patient was informed to apply an ice pack approximately 10min once arriving home and not to do anything strenuous for 24hours.  He was instructed to call if there were any problems. The patient was discharged in stable condition.    The patient understands, chooses and consents to this plan and accepts all   the risks which include but are not limited to the risks mentioned above.     Disclaimer: This note was prepared using a voice recognition system and is likely to have sound alike errors within the text.

## 2018-12-24 NOTE — LETTER
December 24, 2018      HEAVEN Munoz Jr., MD  [address]  [place] [address]           [facility]  [address]  [place] [address]  Phone: [phone]  Fax: [phone]          Patient: Pradeep Aranda   MR Number: [id number]   YOB: 1975   Date of Visit: 12/24/2018       Dear Dr. HEAVEN Munoz Jr.:    Thank you for referring Pradeep Aranda to me for evaluation. Attached you will find relevant portions of my assessment and plan of care.    If you have questions, please do not hesitate to call me. I look forward to following Pradeep Aranda along with you.    Sincerely,    Cecily Garcia PA-C    Enclosure  CC:  No Recipients    If you would like to receive this communication electronically, please contact [email or web address] or [phone] to request more information on "[employer] Link access.    For providers and/or their staff who would like to refer a patient to [facility], please contact us through our one-stop-shop provider referral line, [facility], at [phone].    If you feel you have received this communication in error or would no longer like to receive these types of communications, please e-mail [email or web address]

## 2018-12-26 ENCOUNTER — APPOINTMENT (OUTPATIENT)
Dept: LAB | Facility: HOSPITAL | Age: 43
End: 2018-12-26
Attending: PEDIATRICS

## 2018-12-27 ENCOUNTER — TELEPHONE (OUTPATIENT)
Dept: ORTHOPEDICS | Facility: CLINIC | Age: 43
End: 2018-12-27

## 2018-12-27 NOTE — TELEPHONE ENCOUNTER
----- Message from Fam Talbot sent at 12/27/2018 11:33 AM CST -----  Contact: pt  He's calling in regards to the test ordered for the pt, pt states [place] and needs a letter in writing on letterhead, pls call pt concerning this so he can explain better, [phone] (home)

## 2019-07-10 ENCOUNTER — OFFICE VISIT (OUTPATIENT)
Dept: ORTHOPEDICS | Facility: CLINIC | Age: 44
End: 2019-07-10

## 2019-07-10 VITALS — HEIGHT: 73 IN | WEIGHT: 262 LBS | BODY MASS INDEX: 34.72 KG/M2

## 2019-07-10 DIAGNOSIS — M65.4 DE QUERVAIN'S TENOSYNOVITIS, BILATERAL: Primary | ICD-10-CM

## 2019-07-10 PROCEDURE — 99213 OFFICE O/P EST LOW 20 MIN: CPT | Mod: PBBFAC | Performed by: PHYSICIAN ASSISTANT

## 2019-07-10 PROCEDURE — 20550 PR INJECT TENDON SHEATH/LIGAMENT: ICD-10-PCS | Mod: S$PBB,RT,, | Performed by: PHYSICIAN ASSISTANT

## 2019-07-10 PROCEDURE — 99999 PR PBB SHADOW E&M-EST. PATIENT-LVL III: ICD-10-PCS | Mod: PBBFAC,,, | Performed by: PHYSICIAN ASSISTANT

## 2019-07-10 PROCEDURE — 99214 OFFICE O/P EST MOD 30 MIN: CPT | Mod: 25,S$PBB,, | Performed by: PHYSICIAN ASSISTANT

## 2019-07-10 PROCEDURE — 99999 PR PBB SHADOW E&M-EST. PATIENT-LVL III: CPT | Mod: PBBFAC,,, | Performed by: PHYSICIAN ASSISTANT

## 2019-07-10 PROCEDURE — 20550 NJX 1 TENDON SHEATH/LIGAMENT: CPT | Mod: 50,PBBFAC | Performed by: PHYSICIAN ASSISTANT

## 2019-07-10 PROCEDURE — 20550 NJX 1 TENDON SHEATH/LIGAMENT: CPT | Mod: S$PBB,RT,, | Performed by: PHYSICIAN ASSISTANT

## 2019-07-10 PROCEDURE — 99214 PR OFFICE/OUTPT VISIT, EST, LEVL IV, 30-39 MIN: ICD-10-PCS | Mod: 25,S$PBB,, | Performed by: PHYSICIAN ASSISTANT

## 2019-07-10 RX ORDER — METHYLPREDNISOLONE ACETATE 80 MG/ML
80 INJECTION, SUSPENSION INTRA-ARTICULAR; INTRALESIONAL; INTRAMUSCULAR; SOFT TISSUE ONCE
Status: COMPLETED | OUTPATIENT
Start: 2019-07-10 | End: 2019-07-10

## 2019-07-10 RX ADMIN — METHYLPREDNISOLONE ACETATE 80 MG: 80 INJECTION, SUSPENSION INTRALESIONAL; INTRAMUSCULAR; INTRASYNOVIAL; SOFT TISSUE at 02:07

## 2019-07-10 NOTE — PROGRESS NOTES
Patient ID: Pradeep Aranda is a 44 y.o. male.    Chief Complaint: Pain of the Left Wrist and Pain of the Right Wrist      HPI: Pradeep Aranda  is a 44 y.o. male who c/o Pain of the Left Wrist and Pain of the Right Wrist   for duration of chronically, but worse since May.  I gave him de Quervain tenosynovitis injections in bilateral wrists last December.  Those worked great for about 5 months.  This started wearing off about a month and a half ago.  Pain level is moderate in severity.  The right is worse than the left.  Quality is aching.  It radiates into the thumb and occasionally into the forearms.  He denies associated numbness and tingling.  It is worsened with repetitive use and gripping.  Improved with injections and bracing.    Past Medical History:   Diagnosis Date    Back pain     DDD (degenerative disc disease), lumbar     Hoffa's fat pad disease     Kidney stone on left side      Past Surgical History:   Procedure Laterality Date    ARTHROSCOPY-KNEE W/ CHONDROPLASTY Bilateral 5/19/2016    [facility]    ARTHROSCOPY-KNEE-BILATERAL Bilateral 5/19/2016    [facility] OR    COLONOSCOPY N/A 1/26/2016    [facility] R[facility] at [facility] ENDO    ESOPHAGOGASTRODUODENOSCOPY (EGD) N/A 1/26/2016    [facility] ENDO    KNEE ARTHROSCOPY Bilateral 05/19/2016    LATERAL RELEASE Right 5/19/2016    [facility] OR    none      REFRACTIVE SURGERY Bilateral 2006    SYNOVECTOMY-KNEE Bilateral 5/19/2016    [facility] OR     Family History   Problem Relation Age of Onset    Cataracts Father     Cancer Sister     Diabetes Mellitus Unknown     Hypertension Unknown     Allergies Unknown     Stroke Unknown     Cataracts Maternal Grandmother     Glaucoma Maternal Grandmother      Social History     Socioeconomic History    Marital status:  Single     Spouse name: Not on file    Number of children: Not on file    Years of education: Not on file    Highest education level: Not on file   Occupational History    Not on file   Social Needs    Financial resource strain: Not on file    Food insecurity:     Worry: Not on file     Inability: Not on file    Transportation needs:     Medical: Not on file     Non-medical: Not on file   Tobacco Use    Smoking status: Never Smoker    Smokeless tobacco: Never Used   Substance and Sexual Activity    Alcohol use: Yes     Comment: occasionally    Drug use: No    Sexual activity: Yes     Partners: Female   Lifestyle    Physical activity:     Days per week: Not on file     Minutes per session: Not on file    Stress: Not on file   Relationships    Social connections:     Talks on phone: Not on file     Gets together: Not on file     Attends Caodaism service: Not on file     Active member of club or organization: Not on file     Attends meetings of clubs or organizations: Not on file     Relationship status: Not on file   Other Topics Concern    Not on file   Social History Narrative    Not on file     Medication List with Changes/Refills   Discontinued Medications    MELOXICAM (MOBIC) 15 MG TABLET    Take 1 tablet (15 mg total) by mouth once daily. Take with food.  Discontinue if you develop GI side effects.     Review of patient's allergies indicates:   Allergen Reactions    Penicillins      Other reaction(s): Anaphylaxis           Objective:        General    Nursing note and vitals reviewed.  Constitutional: He is oriented to person, place, and time. He appears well-developed and well-nourished.   HENT:   Head: Normocephalic and atraumatic.   Eyes: EOM are normal.   Cardiovascular: Normal rate and regular rhythm.    Pulmonary/Chest: Effort normal.   Abdominal: Soft.   Neurological: He is alert and oriented to person, place, and time.   Psychiatric: He has a normal mood and affect. His behavior is  normal.             Right Hand/Wrist Exam     Inspection   Scars: Wrist - absent Hand -  absent  Effusion: Wrist - absent Hand -  absent  Bruising: Wrist - absent Hand -  absent  Deformity: Wrist - deformity Hand -  deformity    Tenderness   The patient is tender to palpation of the radial area.    Range of Motion     Wrist   Extension: normal   Flexion: normal   Pronation: normal   Supination: normal     Tests   Phalens Sign: negative  Tinel's sign (median nerve): negative  Finkelstein's test: positive    Atrophy   Thenar:  negative  Hypothenar:  negative  Intrinsic:  negative  1st Dorsal Interosseous: negative    Other     Neuorologic Exam    Median Distribution: normal  Ulnar Distribution: normal  Radial Distribution: normal    Comments:  TTP 1st dorsal compartment      Left Hand/Wrist Exam     Inspection   Scars: Wrist - absent Hand -  absent  Effusion: Wrist - absent Hand -  absent  Bruising: Wrist - absent Hand -  absent  Deformity: Wrist - absent Hand -  absent    Tenderness   The patient is tender to palpation of the radial area.     Range of Motion     Wrist   Extension: normal   Flexion: normal   Pronation: normal   Supination: normal     Tests   Phalens Sign: negative  Tinel's sign (median nerve): negative  Finkelstein's test: positive    Atrophy  Thenar:  Negative  Hypothenar:  negative  Intrinsic: negative  1st Dorsal Interosseous:  negative    Other     Sensory Exam  Median Distribution: normal  Ulnar Distribution: normal  Radial Distribution: normal    Comments:  2+ radial pulse  TTP 1st dorsal compartment      Right Elbow Exam     Tests   Tinel's sign (cubital tunnel): negative      Left Elbow Exam     Tests   Tinel's sign (cubital tunnel): negative        Muscle Strength   Right Upper Extremity   Wrist extension: 5/5/5   Wrist flexion: 5/5/5   : 5/5/5   Thumb - APB: 4/5  Pinch Mechanism: 5/5  Left Upper Extremity  Wrist extension: 5/5/5   Wrist flexion: 5/5/5   :  5/5/5   Pinch Mechanism:  5/5    Vascular Exam       Capillary Refill  Right Hand: normal capillary refill  Left Hand: normal capillary refill              Assessment:       Encounter Diagnosis   Name Primary?    De Quervain's tenosynovitis, bilateral Yes          Plan:       Pradeep was seen today for pain and pain.    Diagnoses and all orders for this visit:    De Quervain's tenosynovitis, bilateral  -     methylPREDNISolone acetate injection 80 mg  -     methylPREDNISolone acetate injection 80 mg        Pradeep Aranda is an established pt here for exacerbation of an existing problem.  We have again discussed risks and benefits of a de Quervain injection.  He wishes to proceed.  At some point in the future, he would benefit from a de Quervain release.  [place] will [place] in the next few months and plans to get on better insurance.  He would like to consider de Quervain release when he is on insurance.  He verbalizes understanding and agrees.  He will follow up p.r.n..    Follow up if symptoms worsen or fail to improve.    Left Dequervain's Injection Report:  After verbal consent was obtained for left wrist dequervain's injection, patient ID, site, and side were verified.  The  left wrist was sterilly prepped in the standard fashion.  A 22-gauge needle was introduced into left wrist without complication. The left wrist dequervain's tenosynovitis was then injected with 10 mg lidocaine plain and 40 mg depomedrol.  A sterile bandaid was applied.  The patient was informed to apply an ice pack approximately 10min once arriving home and not to do anything strenuous for 24hours.  He was instructed to call if there were any problems. The patient was discharged in stable condition.    Right Dequervain's Injection Report:  After verbal consent was obtained for right wrist dequervain's injection, patient ID, site, and side were verified.  The  right wrist was sterilly prepped in the standard fashion.  A 22-gauge needle was  introduced into right wrist without complication. The rightt wrist dequervain's tenosynovitis was then injected with 10 mg lidocaine plain and 40 mg depomedrol.  A sterile bandaid was applied.  The patient was informed to apply an ice pack approximately 10min once arriving home and not to do anything strenuous for 24hours.  He was instructed to call if there were any problems. The patient was discharged in stable condition.    The patient understands, chooses and consents to this plan and accepts all   the risks which include but are not limited to the risks mentioned above.     Disclaimer: This note was prepared using a voice recognition system and is likely to have sound alike errors within the text.

## 2019-07-15 ENCOUNTER — OFFICE VISIT (OUTPATIENT)
Dept: INTERNAL MEDICINE | Facility: CLINIC | Age: 44
End: 2019-07-15

## 2019-07-15 VITALS
OXYGEN SATURATION: 98 % | WEIGHT: 266.56 LBS | HEIGHT: 73 IN | BODY MASS INDEX: 35.33 KG/M2 | TEMPERATURE: 98 F | HEART RATE: 63 BPM | SYSTOLIC BLOOD PRESSURE: 124 MMHG | DIASTOLIC BLOOD PRESSURE: 86 MMHG

## 2019-07-15 DIAGNOSIS — B35.4 TINEA CORPORIS: ICD-10-CM

## 2019-07-15 DIAGNOSIS — Z00.00 WELL ADULT EXAM: Primary | ICD-10-CM

## 2019-07-15 PROBLEM — E29.1 HYPOGONADISM IN MALE: Status: RESOLVED | Noted: 2017-11-14 | Resolved: 2019-07-15

## 2019-07-15 PROCEDURE — 99999 PR PBB SHADOW E&M-EST. PATIENT-LVL III: CPT | Mod: PBBFAC,,, | Performed by: PEDIATRICS

## 2019-07-15 PROCEDURE — 99213 OFFICE O/P EST LOW 20 MIN: CPT | Mod: PBBFAC | Performed by: PEDIATRICS

## 2019-07-15 PROCEDURE — 99396 PREV VISIT EST AGE 40-64: CPT | Mod: S$PBB,,, | Performed by: PEDIATRICS

## 2019-07-15 PROCEDURE — 99999 PR PBB SHADOW E&M-EST. PATIENT-LVL III: ICD-10-PCS | Mod: PBBFAC,,, | Performed by: PEDIATRICS

## 2019-07-15 PROCEDURE — 99396 PR PREVENTIVE VISIT,EST,40-64: ICD-10-PCS | Mod: S$PBB,,, | Performed by: PEDIATRICS

## 2019-07-15 RX ORDER — NAPROXEN SODIUM 220 MG
220 TABLET ORAL
COMMUNITY

## 2019-07-15 RX ORDER — NYSTATIN AND TRIAMCINOLONE ACETONIDE 100000; 1 [USP'U]/G; MG/G
CREAM TOPICAL 2 TIMES DAILY
Qty: 60 G | Refills: 2 | Status: SHIPPED | OUTPATIENT
Start: 2019-07-15 | End: 2022-09-30

## 2019-07-15 NOTE — PROGRESS NOTES
Subjective:       Patient ID: Pradeep Aranda is a 44 y.o. male.    Chief Complaint: Follow-up    Here for yearly and perianal rash and itching    PMH/PSH/SH/FH reviewed with patient. Anxiety resolved, renal stones quiet. Working out and dieting regularly, weight is stable- previous weight loss in [facility] was due to lifestyle mod.  He has intermittently had perianal rash and itching when he gets hot and sweating. This epsiode started in [place] several weeks ago and worsened with outside security activity. He has been putting OTC yeast meds with only partial relief. Pruritis keeping him up.    Review of Systems   Constitutional: Negative for fever and unexpected weight change.   HENT: Negative for congestion and rhinorrhea.    Eyes: Negative for discharge and redness.   Respiratory: Negative for cough and wheezing.    Cardiovascular: Negative for chest pain, palpitations and leg swelling.   Gastrointestinal: Negative for abdominal pain, constipation, diarrhea and vomiting.   Genitourinary: Negative for decreased urine volume, difficulty urinating, dysuria, genital sores and hematuria.   Musculoskeletal: Positive for arthralgias and joint swelling. Negative for back pain.        Ortho addressing and improving   Skin: Positive for rash. Negative for wound.   Neurological: Negative for syncope and headaches.   Psychiatric/Behavioral: Positive for sleep disturbance. Negative for behavioral problems, dysphoric mood and self-injury. The patient is not nervous/anxious.        Objective:      Physical Exam   Constitutional: He is oriented to person, place, and time. He appears well-developed and well-nourished. No distress.   Neck: No JVD present. No thyromegaly present.   Cardiovascular: Normal rate, regular rhythm and normal heart sounds.   No murmur heard.  Pulmonary/Chest: Effort normal and breath sounds normal. No respiratory distress. He has no wheezes. He has no rales.   Abdominal: Soft. He exhibits no  distension and no mass. There is no tenderness. There is no guarding.   Genitourinary: Penis normal.   Genitourinary Comments: Testis w/o lesions. Perianal rash and hypopigmented.   Musculoskeletal: He exhibits no edema.   Lymphadenopathy:     He has no cervical adenopathy.   Neurological: He is alert and oriented to person, place, and time. No cranial nerve deficit. Coordination normal.   Skin: Capillary refill takes less than 2 seconds. No rash noted.   Psychiatric: He has a normal mood and affect. His behavior is normal. Judgment and thought content normal.       Assessment:       1. Well adult exam    2. Tinea corporis        Plan:       Well adult exam  -     Lipid panel; Future; Expected date: 07/15/2019    Tinea corporis  -     nystatin-triamcinolone (MYCOLOG II) cream; Apply topically 2 (two) times daily.  Dispense: 60 g; Refill: 2    HM issues reviewed. Await fasting lipids. Anal psoriasis is a possibility. Use Antimonkey butt powder in day and change frequently when sweating and mycolog at least at night. If not better consider derm referral. F/U yearly.

## 2019-08-15 ENCOUNTER — OFFICE VISIT (OUTPATIENT)
Dept: INTERNAL MEDICINE | Facility: CLINIC | Age: 44
End: 2019-08-15

## 2019-08-15 ENCOUNTER — LAB VISIT (OUTPATIENT)
Dept: LAB | Facility: HOSPITAL | Age: 44
End: 2019-08-15
Attending: NURSE PRACTITIONER

## 2019-08-15 VITALS
SYSTOLIC BLOOD PRESSURE: 124 MMHG | BODY MASS INDEX: 35.41 KG/M2 | TEMPERATURE: 98 F | WEIGHT: 267.19 LBS | DIASTOLIC BLOOD PRESSURE: 68 MMHG | HEART RATE: 87 BPM | OXYGEN SATURATION: 97 % | HEIGHT: 73 IN

## 2019-08-15 DIAGNOSIS — Z11.3 SCREEN FOR STD (SEXUALLY TRANSMITTED DISEASE): ICD-10-CM

## 2019-08-15 DIAGNOSIS — J03.90 TONSILLITIS: ICD-10-CM

## 2019-08-15 DIAGNOSIS — J20.9 ACUTE BRONCHITIS, UNSPECIFIED ORGANISM: Primary | ICD-10-CM

## 2019-08-15 PROCEDURE — 99214 PR OFFICE/OUTPT VISIT, EST, LEVL IV, 30-39 MIN: ICD-10-PCS | Mod: S$PBB,,, | Performed by: NURSE PRACTITIONER

## 2019-08-15 PROCEDURE — 86703 HIV-1/HIV-2 1 RESULT ANTBDY: CPT

## 2019-08-15 PROCEDURE — 99999 PR PBB SHADOW E&M-EST. PATIENT-LVL III: ICD-10-PCS | Mod: PBBFAC,,, | Performed by: NURSE PRACTITIONER

## 2019-08-15 PROCEDURE — 99214 OFFICE O/P EST MOD 30 MIN: CPT | Mod: S$PBB,,, | Performed by: NURSE PRACTITIONER

## 2019-08-15 PROCEDURE — 99999 PR PBB SHADOW E&M-EST. PATIENT-LVL III: CPT | Mod: PBBFAC,,, | Performed by: NURSE PRACTITIONER

## 2019-08-15 PROCEDURE — 99213 OFFICE O/P EST LOW 20 MIN: CPT | Mod: PBBFAC | Performed by: NURSE PRACTITIONER

## 2019-08-15 PROCEDURE — 36415 COLL VENOUS BLD VENIPUNCTURE: CPT

## 2019-08-15 PROCEDURE — 80074 ACUTE HEPATITIS PANEL: CPT

## 2019-08-15 RX ORDER — AZITHROMYCIN 250 MG/1
TABLET, FILM COATED ORAL
Qty: 6 TABLET | Refills: 0 | Status: SHIPPED | OUTPATIENT
Start: 2019-08-15 | End: 2020-06-19

## 2019-08-15 RX ORDER — DOXYCYCLINE 100 MG/1
100 CAPSULE ORAL 2 TIMES DAILY
Qty: 20 CAPSULE | Refills: 0 | Status: SHIPPED | OUTPATIENT
Start: 2019-08-15 | End: 2019-08-25

## 2019-08-15 RX ORDER — PROMETHAZINE HYDROCHLORIDE AND DEXTROMETHORPHAN HYDROBROMIDE 6.25; 15 MG/5ML; MG/5ML
5 SYRUP ORAL NIGHTLY PRN
Qty: 180 ML | Refills: 0 | Status: SHIPPED | OUTPATIENT
Start: 2019-08-15 | End: 2019-08-25

## 2019-08-15 RX ORDER — METHYLPREDNISOLONE 4 MG/1
TABLET ORAL
Qty: 1 PACKAGE | Refills: 0 | Status: SHIPPED | OUTPATIENT
Start: 2019-08-15 | End: 2019-09-05

## 2019-08-15 NOTE — PATIENT INSTRUCTIONS
Bronchitis with Wheezing (Viral or Bacterial: Adult)    Bronchitis is an infection of the air passages. It often occurs during a cold and is usually caused by a virus. Symptoms include cough with mucus (phlegm) and low-grade fever. This illness is contagious during the first few days and is spread through the air by coughing and sneezing, or by direct contact (touching the sick person and then touching your own eyes, nose, or mouth).  If there is a lot of inflammation, air flow is restricted. The air passages may also go into spasm, especially if you have asthma. This causes wheezing and difficulty breathing even in people who do not have asthma.  Bronchitis usually lasts 7 to 14 days. The wheezing should improve with treatment during the first week. An inhaler is often prescribed to relax the air passages and stop wheezing. Antibiotics will be prescribed if your doctor thinks there is also a secondary bacterial infection.  Home care  · If symptoms are severe, rest at home for the first 2 to 3 days. When you go back to your usual activities, don't let yourself get too tired.  · Do not smoke. Also avoid being exposed to secondhand smoke.  · You may use over-the-counter medicine to control fever or pain, unless another medicine was prescribed. Note: If you have chronic liver or kidney disease or have ever had a stomach ulcer or gastrointestinal bleeding, talk with your healthcare provider before using these medicines. Also talk to your provider if you are taking medicine to prevent blood clots.) Aspirin should never be given to anyone younger than 18 years of age who is ill with a viral infection or fever. It may cause severe liver or brain damage.  · Your appetite may be poor, so a light diet is fine. Avoid dehydration by drinking 6 to 8 glasses of fluids per day (such as water, soft drinks, sports drinks, juices, tea, or soup). Extra fluids will help loosen secretions in the nose and lungs.  · Over-the-counter  cough, cold, and sore-throat medicines will not shorten the length of the illness, but they may be helpful to reduce symptoms. (Note: Do not use decongestants if you have high blood pressure.)  · If you were given an inhaler, use it exactly as directed. If you need to use it more often than prescribed, your condition may be worsening. If this happens, contact your healthcare provider.  · If prescribed, finish all antibiotic medicine, even if you are feeling better after only a few days.  Follow-up care  Follow up with your healthcare provider, or as advised. If you had an X-ray or ECG (electrocardiogram), a specialist will review it. You will be notified of any new findings that may affect your care.  Note: If you are age 65 or older, or if you have a chronic lung disease or condition that affects your immune system, or you smoke, talk to your healthcare provider about having a pneumococcal vaccinations and a yearly influenza vaccination (flu shot).  When to seek medical advice  Call your healthcare provider right away if any of these occur:  · Fever of 100.4°F (38°C) or higher  · Coughing up increasing amounts of colored sputum  · Weakness, drowsiness, headache, facial pain, ear pain, or a stiff neck  Call 911, or get immediate medical care  Contact emergency services right away if any of these occur.  · Coughing up blood  · Worsening weakness, drowsiness, headache, or stiff neck  · Increased wheezing not helped with medication, shortness of breath, or pain with breathing  Date Last Reviewed: 9/13/2015  © 9973-3348 [employer]. [address] [place] [address], [place] [address]. All rights reserved. This information is not intended as a substitute for professional medical care. Always follow your healthcare professional's instructions.

## 2019-08-15 NOTE — PROGRESS NOTES
Subjective:       Patient ID: Pradeep Aranda is a 44 y.o. male.    Chief Complaint: Chest Congestion    Cough   This is a recurrent problem. The current episode started in the past 7 days. The problem has been gradually worsening. The problem occurs constantly. The cough is non-productive. Associated symptoms include a fever, nasal congestion, postnasal drip and a sore throat. Pertinent negatives include no chest pain, chills, ear congestion, ear pain, headaches, heartburn, hemoptysis, myalgias, rash, rhinorrhea, shortness of breath, sweats, weight loss or wheezing. The symptoms are aggravated by lying down. He has tried OTC cough suppressant for the symptoms. The treatment provided no relief. His past medical history is significant for bronchitis, environmental allergies and pneumonia. There is no history of asthma, bronchiectasis, COPD or emphysema.             Review of Systems   Constitutional: Positive for fever. Negative for activity change, appetite change, chills, diaphoresis, fatigue, unexpected weight change and weight loss.   HENT: Positive for postnasal drip and sore throat. Negative for congestion, ear pain, rhinorrhea, sinus pressure, sinus pain, sneezing, tinnitus, trouble swallowing and voice change.    Eyes: Negative for photophobia, pain and visual disturbance.   Respiratory: Positive for cough. Negative for hemoptysis, chest tightness, shortness of breath and wheezing.    Cardiovascular: Negative for chest pain, palpitations and leg swelling.   Gastrointestinal: Negative for abdominal distention, abdominal pain, constipation, diarrhea, heartburn, nausea and vomiting.   Genitourinary: Negative for decreased urine volume, difficulty urinating, dysuria, flank pain, frequency, hematuria and urgency.   Musculoskeletal: Negative for arthralgias, back pain, joint swelling, myalgias, neck pain and neck stiffness.   Skin: Negative for rash.   Allergic/Immunologic: Positive for environmental  allergies. Negative for immunocompromised state.   Neurological: Negative for dizziness, tremors, seizures, syncope, facial asymmetry, speech difficulty, weakness, light-headedness, numbness and headaches.   Hematological: Negative for adenopathy. Does not bruise/bleed easily.   Psychiatric/Behavioral: Negative for confusion and sleep disturbance.       Objective:      Physical Exam   Constitutional: He is oriented to person, place, and time.   HENT:   Right Ear: Tympanic membrane normal.   Left Ear: Tympanic membrane normal.   Nose: Nose normal.   Mouth/Throat: Uvula is midline. Posterior oropharyngeal erythema present.   Neck: Normal range of motion. Neck supple.   Cardiovascular: Normal rate, regular rhythm and normal heart sounds.   Pulmonary/Chest: Effort normal. He has wheezes.   Abdominal: Soft. Bowel sounds are normal.   Neurological: He is alert and oriented to person, place, and time.   Skin: Skin is warm and dry.   Psychiatric: He has a normal mood and affect.       Assessment:     Vitals:    08/15/19 1416   BP: 124/68   Pulse: 87   Temp: 97.9 °F (36.6 °C)         1. Acute bronchitis, unspecified organism    2. Tonsillitis    3. Screen for STD (sexually transmitted disease)        Plan:   Acute bronchitis, unspecified organism    Tonsillitis  -     doxycycline (VIBRAMYCIN) 100 MG Cap; Take 1 capsule (100 mg total) by mouth 2 (two) times daily. for 10 days  Dispense: 20 capsule; Refill: 0    Screen for STD (sexually transmitted disease)  -     Hepatitis panel, acute; Future; Expected date: 08/15/2019  -     HIV 1/2 Ag/Ab (4th Gen); Future; Expected date: 08/15/2019    Other orders  -     methylPREDNISolone (MEDROL DOSEPACK) 4 mg tablet; use as directed  Dispense: 1 Package; Refill: 0  -     promethazine-dextromethorphan (PROMETHAZINE-DM) 6.25-15 mg/5 mL Syrp; Take 5 mLs by mouth nightly as needed.  Dispense: 180 mL; Refill: 0  -     azithromycin (Z-QUANG) 250 MG tablet; Take 2 tablets by mouth on day 1; Take  1 tablet by mouth on days 2-5  Dispense: 6 tablet; Refill: 0        As above  He is taking zpack [place]  No std s/s - pt states that he needs for work  Continue mucinex dm during the day

## 2019-08-16 LAB
HAV IGM SERPL QL IA: NEGATIVE
HBV CORE IGM SERPL QL IA: NEGATIVE
HBV SURFACE AG SERPL QL IA: NEGATIVE
HCV AB SERPL QL IA: NEGATIVE
HIV 1+2 AB+HIV1 P24 AG SERPL QL IA: NEGATIVE

## 2019-08-22 ENCOUNTER — PATIENT MESSAGE (OUTPATIENT)
Dept: INTERNAL MEDICINE | Facility: CLINIC | Age: 44
End: 2019-08-22

## 2019-10-09 ENCOUNTER — OFFICE VISIT (OUTPATIENT)
Dept: INTERNAL MEDICINE | Facility: CLINIC | Age: 44
End: 2019-10-09

## 2019-10-09 VITALS
SYSTOLIC BLOOD PRESSURE: 136 MMHG | BODY MASS INDEX: 34.57 KG/M2 | HEIGHT: 73 IN | HEART RATE: 75 BPM | WEIGHT: 260.81 LBS | RESPIRATION RATE: 16 BRPM | OXYGEN SATURATION: 97 % | DIASTOLIC BLOOD PRESSURE: 92 MMHG | TEMPERATURE: 99 F

## 2019-10-09 DIAGNOSIS — L03.012 PARONYCHIA OF FINGER OF LEFT HAND: Primary | ICD-10-CM

## 2019-10-09 PROCEDURE — 99999 PR PBB SHADOW E&M-EST. PATIENT-LVL III: CPT | Mod: PBBFAC,,, | Performed by: PEDIATRICS

## 2019-10-09 PROCEDURE — 99212 PR OFFICE/OUTPT VISIT, EST, LEVL II, 10-19 MIN: ICD-10-PCS | Mod: S$PBB,,, | Performed by: PEDIATRICS

## 2019-10-09 PROCEDURE — 99999 PR PBB SHADOW E&M-EST. PATIENT-LVL III: ICD-10-PCS | Mod: PBBFAC,,, | Performed by: PEDIATRICS

## 2019-10-09 PROCEDURE — 99213 OFFICE O/P EST LOW 20 MIN: CPT | Mod: PBBFAC | Performed by: PEDIATRICS

## 2019-10-09 PROCEDURE — 99212 OFFICE O/P EST SF 10 MIN: CPT | Mod: S$PBB,,, | Performed by: PEDIATRICS

## 2019-10-09 PROCEDURE — 90471 IMMUNIZATION ADMIN: CPT | Mod: PBBFAC

## 2019-10-09 RX ORDER — MUPIROCIN 20 MG/G
OINTMENT TOPICAL
Qty: 30 G | Refills: 0 | Status: SHIPPED | OUTPATIENT
Start: 2019-10-09 | End: 2019-10-23

## 2019-10-09 RX ORDER — SULFAMETHOXAZOLE AND TRIMETHOPRIM 800; 160 MG/1; MG/1
1 TABLET ORAL 2 TIMES DAILY
Qty: 20 TABLET | Refills: 0 | Status: SHIPPED | OUTPATIENT
Start: 2019-10-09 | End: 2019-10-19

## 2019-10-09 NOTE — PROGRESS NOTES
Subjective:       Patient ID: Pradeep Aranda is a 44 y.o. male.    Chief Complaint: Ingrown Fingernail (LT) and Skin Tags (Both arms)    Traumatized middle left finger 6 months ago. Nail  and slowly grew back. 2 weeks ago redness, swelling, drainage and pain. He dug out with needle.    Review of Systems   Constitutional: Negative for fever and unexpected weight change.   HENT: Negative for congestion and rhinorrhea.    Eyes: Negative for discharge and redness.   Respiratory: Negative for cough and wheezing.    Gastrointestinal: Negative for constipation, diarrhea and vomiting.   Genitourinary: Negative for decreased urine volume and difficulty urinating.   Musculoskeletal: Negative for arthralgias and joint swelling.   Skin: Negative for rash and wound.   Neurological: Negative for syncope and headaches.   Psychiatric/Behavioral: Negative for behavioral problems and sleep disturbance.       Objective:      Physical Exam   Constitutional: He appears well-developed and well-nourished. No distress.   Skin:   Benign skin tags in axillae. Middle left finger with redness and swelling completely around the nail, no drainage. Nail is growing out.       Assessment:       1. Paronychia of finger of left hand        Plan:       Paronychia of finger of left hand  -     mupirocin (BACTROBAN) 2 % ointment; Apply to wound and nares QID  Dispense: 30 g; Refill: 0  -     sulfamethoxazole-trimethoprim 800-160mg (BACTRIM DS) 800-160 mg Tab; Take 1 tablet by mouth 2 (two) times daily. for 10 days  Dispense: 20 tablet; Refill: 0    If not improved, see derm for debridement. No action needed for skin tags(<4 mm in size). Flu vaccine.

## 2019-11-01 ENCOUNTER — OFFICE VISIT (OUTPATIENT)
Dept: INTERNAL MEDICINE | Facility: CLINIC | Age: 44
End: 2019-11-01

## 2019-11-01 ENCOUNTER — LAB VISIT (OUTPATIENT)
Dept: LAB | Facility: HOSPITAL | Age: 44
End: 2019-11-01
Attending: PEDIATRICS

## 2019-11-01 VITALS
DIASTOLIC BLOOD PRESSURE: 92 MMHG | WEIGHT: 263.25 LBS | OXYGEN SATURATION: 96 % | RESPIRATION RATE: 16 BRPM | SYSTOLIC BLOOD PRESSURE: 122 MMHG | TEMPERATURE: 98 F | HEART RATE: 76 BPM | BODY MASS INDEX: 34.89 KG/M2 | HEIGHT: 73 IN

## 2019-11-01 DIAGNOSIS — Z20.2 POSSIBLE EXPOSURE TO STD: Primary | ICD-10-CM

## 2019-11-01 DIAGNOSIS — L60.0 INGROWN FINGERNAIL: ICD-10-CM

## 2019-11-01 DIAGNOSIS — Z20.2 POSSIBLE EXPOSURE TO STD: ICD-10-CM

## 2019-11-01 LAB
C TRACH DNA SPEC QL NAA+PROBE: NOT DETECTED
N GONORRHOEA DNA SPEC QL NAA+PROBE: NOT DETECTED

## 2019-11-01 PROCEDURE — 99212 OFFICE O/P EST SF 10 MIN: CPT | Mod: S$PBB,,, | Performed by: PEDIATRICS

## 2019-11-01 PROCEDURE — 99999 PR PBB SHADOW E&M-EST. PATIENT-LVL IV: CPT | Mod: PBBFAC,,, | Performed by: PEDIATRICS

## 2019-11-01 PROCEDURE — 86696 HERPES SIMPLEX TYPE 2 TEST: CPT

## 2019-11-01 PROCEDURE — 86694 HERPES SIMPLEX NES ANTBDY: CPT

## 2019-11-01 PROCEDURE — 87491 CHLMYD TRACH DNA AMP PROBE: CPT

## 2019-11-01 PROCEDURE — 86703 HIV-1/HIV-2 1 RESULT ANTBDY: CPT

## 2019-11-01 PROCEDURE — 86592 SYPHILIS TEST NON-TREP QUAL: CPT

## 2019-11-01 PROCEDURE — 99212 PR OFFICE/OUTPT VISIT, EST, LEVL II, 10-19 MIN: ICD-10-PCS | Mod: S$PBB,,, | Performed by: PEDIATRICS

## 2019-11-01 PROCEDURE — 99214 OFFICE O/P EST MOD 30 MIN: CPT | Mod: PBBFAC | Performed by: PEDIATRICS

## 2019-11-01 PROCEDURE — 36415 COLL VENOUS BLD VENIPUNCTURE: CPT

## 2019-11-01 PROCEDURE — 99999 PR PBB SHADOW E&M-EST. PATIENT-LVL IV: ICD-10-PCS | Mod: PBBFAC,,, | Performed by: PEDIATRICS

## 2019-11-01 NOTE — PROGRESS NOTES
Subjective:       Patient ID: Pradeep Aranda is a 44 y.o. male.    Chief Complaint: Hand Pain (Lt 3rd digit re-check)    F/U ingrown fingernail. The redness and swelling have resolved, pain almost completely gone. What concerns him is that GF may have outbreak of herpes and he wonders if this was herpetic alvin could he have unknowingly transmitted to her. He has hx of oral cold sores.    Review of Systems   Constitutional: Negative for fever and unexpected weight change.   HENT: Negative for congestion and rhinorrhea.    Eyes: Negative for discharge and redness.   Respiratory: Negative for cough and wheezing.    Cardiovascular: Negative for chest pain, palpitations and leg swelling.   Gastrointestinal: Negative for constipation, diarrhea and vomiting.   Genitourinary: Negative for decreased urine volume and difficulty urinating.   Musculoskeletal: Negative for arthralgias and joint swelling.   Skin: Negative for rash and wound.   Neurological: Negative for syncope and headaches.   Psychiatric/Behavioral: Negative for behavioral problems and sleep disturbance.       Objective:      Physical Exam   Constitutional: He appears well-developed and well-nourished. No distress.   Skin:   The right 3rd finger has resolution of infection and swelling. There is small portion of hang nail but can be lifted up with only mild pain.       Assessment:       1. Possible exposure to STD    2. Ingrown fingernail        Plan:       Possible exposure to STD  -     C. trachomatis/N. gonorrhoeae by AMP DNA; Future; Expected date: 11/01/2019  -     HIV 1/2 Ag/Ab (4th Gen); Future; Expected date: 11/01/2019  -     RPR; Future; Expected date: 11/01/2019  -     HERPES SIMPLEX 1 & 2 IGM; Future; Expected date: 11/01/2019  -     HERPES SIMPLEX 1&2 IGG; Future; Expected date: 11/01/2019    Ingrown fingernail    He will soak finger in soapy warm water BID and slowly lift nail over skin with fingernail file and place cotton under nail to  provide lift, See derm if fails. In terms of possible alvin, If he get new lesion he will come in for culture. Screen for STD again but I explained that positive IGG for herpes would only indicate past exposure/infection but not active disease and not be able to ID his GF's etiology with certainty. She is seeing MD today.

## 2019-11-02 LAB — RPR SER QL: NORMAL

## 2019-11-04 LAB — HIV 1+2 AB+HIV1 P24 AG SERPL QL IA: NEGATIVE

## 2019-11-05 LAB
HSV AB, IGM BY EIA: NEGATIVE
HSV1 IGG SERPL QL IA: POSITIVE
HSV2 IGG SERPL QL IA: POSITIVE

## 2019-11-14 ENCOUNTER — PATIENT OUTREACH (OUTPATIENT)
Dept: ADMINISTRATIVE | Facility: HOSPITAL | Age: 44
End: 2019-11-14

## 2019-11-14 NOTE — PROGRESS NOTES
Fitkit updated       Iva CASTLE LPN Care Coordinator  Care Coordination Department  [facility]  [phone]

## 2019-12-31 ENCOUNTER — PATIENT MESSAGE (OUTPATIENT)
Dept: PHYSICAL MEDICINE AND REHAB | Facility: CLINIC | Age: 44
End: 2019-12-31

## 2019-12-31 ENCOUNTER — OFFICE VISIT (OUTPATIENT)
Dept: PHYSICAL MEDICINE AND REHAB | Facility: CLINIC | Age: 44
End: 2019-12-31

## 2019-12-31 VITALS
DIASTOLIC BLOOD PRESSURE: 91 MMHG | SYSTOLIC BLOOD PRESSURE: 151 MMHG | BODY MASS INDEX: 34.89 KG/M2 | HEIGHT: 73 IN | HEART RATE: 71 BPM | WEIGHT: 263.25 LBS

## 2019-12-31 DIAGNOSIS — M65.4 DE QUERVAIN'S TENOSYNOVITIS, RIGHT: Primary | ICD-10-CM

## 2019-12-31 DIAGNOSIS — M22.2X1 PATELLOFEMORAL DISORDER OF RIGHT KNEE: ICD-10-CM

## 2019-12-31 DIAGNOSIS — M25.531 WRIST PAIN, ACUTE, RIGHT: ICD-10-CM

## 2019-12-31 PROCEDURE — 20550 NJX 1 TENDON SHEATH/LIGAMENT: CPT | Mod: S$PBB,RT,, | Performed by: PHYSICAL MEDICINE & REHABILITATION

## 2019-12-31 PROCEDURE — 20550 PR INJECT TENDON SHEATH/LIGAMENT: ICD-10-PCS | Mod: S$PBB,RT,, | Performed by: PHYSICAL MEDICINE & REHABILITATION

## 2019-12-31 PROCEDURE — 20550 NJX 1 TENDON SHEATH/LIGAMENT: CPT | Mod: PBBFAC | Performed by: PHYSICAL MEDICINE & REHABILITATION

## 2019-12-31 PROCEDURE — 99214 PR OFFICE/OUTPT VISIT, EST, LEVL IV, 30-39 MIN: ICD-10-PCS | Mod: 25,S$PBB,, | Performed by: PHYSICAL MEDICINE & REHABILITATION

## 2019-12-31 PROCEDURE — 99214 OFFICE O/P EST MOD 30 MIN: CPT | Mod: 25,S$PBB,, | Performed by: PHYSICAL MEDICINE & REHABILITATION

## 2019-12-31 PROCEDURE — 99999 PR PBB SHADOW E&M-EST. PATIENT-LVL III: ICD-10-PCS | Mod: PBBFAC,,, | Performed by: PHYSICAL MEDICINE & REHABILITATION

## 2019-12-31 PROCEDURE — 99999 PR PBB SHADOW E&M-EST. PATIENT-LVL III: CPT | Mod: PBBFAC,,, | Performed by: PHYSICAL MEDICINE & REHABILITATION

## 2019-12-31 PROCEDURE — 99213 OFFICE O/P EST LOW 20 MIN: CPT | Mod: PBBFAC,25 | Performed by: PHYSICAL MEDICINE & REHABILITATION

## 2019-12-31 RX ORDER — METHYLPREDNISOLONE ACETATE 40 MG/ML
40 INJECTION, SUSPENSION INTRA-ARTICULAR; INTRALESIONAL; INTRAMUSCULAR; SOFT TISSUE
Status: COMPLETED | OUTPATIENT
Start: 2019-12-31 | End: 2019-12-31

## 2019-12-31 RX ADMIN — METHYLPREDNISOLONE ACETATE 40 MG: 40 INJECTION, SUSPENSION INTRA-ARTICULAR; INTRALESIONAL; INTRAMUSCULAR; SOFT TISSUE at 08:12

## 2019-12-31 NOTE — PROGRESS NOTES
PM&R NEW PATIENT HISTORY & PHYSICAL:    Referring Physician: No ref. provider found    Chief Complaint   Patient presents with    Wrist Pain     Bilateral wrist pain. Right wrist pain is worse than left wrist pain.     Knee Pain     Right knee pain       HPI: This is a 44 y.o.  male being seen in clinic today for evaluation of Wrist Pain (Bilateral wrist pain. Right wrist pain is worse than left wrist pain. ) and Knee Pain (Right knee pain)   The problem in the wrist first began over a year ago. He notes many years ago he would frequently get numbness in the fingers and was diagnosed with CTS. Gradually that seemed to resolve, but he developed worsening pain about his right thumb near the first CMC. 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 He notes this thumb pain can be severe enough to become an occupational liability. He's been treated by Tracey Garcia in the past with injections once or twice and notes these give him months of relief. He is also aware that ongoing injections carry risks and he would like to get this dealt with when he gets back from [place] in a month. Pain is mostly on radial wrist near first CMC joint and worsened with thumb extension and flexion.     He has a new complaint today of right knee pain starting one month ago with a twisting injury. Didn't feel a particular pop, but something didn't feel right when he rotated upper body towards left and right foot stayed planted. May have had some swelling laterally, but not sure. Since then knee has felt a little unstable and he's had to avoid heavy squats. Believes he has a tendonitis in lateral knee, feels a popping near lateral border of patella.     History obtained from patient.    Past family, medical, social, surgical history, and vital signs reviewed in chart.    Review of Systems   Constitutional: Negative for chills, fever and weight loss.   HENT: Negative for hearing loss and sore throat.    Eyes: Negative  for blurred vision, photophobia and pain.   Respiratory: Negative for shortness of breath.    Cardiovascular: Negative for chest pain.   Gastrointestinal: Negative for abdominal pain.   Genitourinary: Negative for dysuria.   Skin: Negative for rash.   Neurological: Negative for tingling and headaches.   Endo/Heme/Allergies: Does not bruise/bleed easily.   Psychiatric/Behavioral: Negative for depression.       General    Nursing note and vitals reviewed.  Constitutional: He is oriented to person, place, and time. He appears well-developed and well-nourished.   HENT:   Head: Normocephalic and atraumatic.   Eyes: Conjunctivae and EOM are normal. Pupils are equal, round, and reactive to light.   Neck: Neck supple.   Cardiovascular: Intact distal pulses.    Pulmonary/Chest: Effort normal. No respiratory distress.   Abdominal: He exhibits no distension.   Neurological: He is alert and oriented to person, place, and time. He has normal reflexes.   Psychiatric: He has a normal mood and affect.           Right Knee Exam     Tenderness   The patient is tender to palpation of the lateral joint line.    Crepitus   The patient has crepitus of the lateral joint line.    Range of Motion   Extension: normal   Flexion: normal     Tests   Meniscus   Edwin:  Medial - negative Lateral - negative  Ligament Examination Lachman: normal (-1 to 2mm) PCL-Posterior Drawer: normal (0 to 2mm)     MCL - Valgus: normal (0 to 2mm)  LCL - Varus: normal  Patella   Patellar apprehension: negative    Other   Sensation: normal    Comments:  Palpable bulging of lateral joint capsule near middle portion of patella on full extension with significant knee crepitus as well. Negative Walker's compression test.     Left Knee Exam   Left knee exam is normal.    Right Hand/Wrist Exam     Inspection   Scars: Wrist - absent   Effusion: Wrist - absent   Deformity: Wrist - deformity Hand -  deformity    Pain   Wrist - The patient exhibits pain of the  CMC.    Tenderness   The patient is tender to palpation of the snuff box.    Range of Motion     Wrist   Extension: normal   Flexion: normal   Pronation: normal   Supination: normal     Tests   Phalens Sign: negative  Tinel's sign (median nerve): negative  Carpal Tunnel Compression Test: negative  Cubital Tunnel Compression Test: negative    Atrophy   Thenar:  negative    Other     Neuorologic Exam    Median Distribution: normal  Ulnar Distribution: normal  Radial Distribution: normal    Comments:  Normal OK sign. Negative Froment's. Negative Mejía's.   Mild pain with resisted thumb extension.        Left Hand/Wrist Exam     Inspection   Scars: Wrist - absent   Effusion: Wrist - absent   Deformity: Wrist - absent Hand -  absent    Range of Motion     Wrist   Extension: normal   Flexion: normal   Pronation: normal   Supination: normal     Tests   Phalens Sign: negative  Tinel's sign (median nerve): negative  Carpal Tunnel Compression Test: negative    Atrophy  Thenar:  Negative    Other     Sensory Exam  Ulnar Distribution: normal  Radial Distribution: normal      Right Elbow Exam     Tests   Tinel's sign (cubital tunnel): negative      Left Elbow Exam     Tests   Tinel's sign (cubital tunnel): negative        Muscle Strength   Right Upper Extremity   Wrist extension: 5/5/5   Wrist flexion: 5/5/5   : 5/5/5   Thumb - APB: 5/5  Thumb - FPL: 5/5  Pinch Mechanism: 5/5  Left Upper Extremity  Wrist extension: 5/5/5   Wrist flexion: 5/5/5   :  5/5/5   Thumb - APB: 5/5  Right Lower Extremity   Hip Abduction: 5/5   Quadriceps:  5/5   Hamstrin/5     Reflexes     Right Side   Quadriceps:  2+  Achilles:  2+    Vascular Exam       Capillary Refill  Right Hand: normal capillary refill  Left Hand: normal capillary refill      IMPRESSION/PLAN: Pradeep is a 44 y.o.  male with:    1. De Quervain's tenosynovitis, right  - methylPREDNISolone acetate injection 40 mg    2. Wrist pain, acute, right  - methylPREDNISolone  acetate injection 40 mg    3. Patellofemoral disorder of right knee       The findings were discussed with Pradeep in detail. I agree with Tracey Jose's previous diagnosis and treatment of De Quervains tenosynovitis. Although I wouldn't recommend many repeated steroid injections to the area, he is well aware of the risks and faces greater risks if limited by thumb pain in his line of work. We reviewed risks and benefits and he decided to proceed as noted below. For the knee, I don't believe it is a tendonitis but more so OA and a component of PFPS. I suggested a Cho-Pat dual action knee strap and ongoing lateral hip strength/stability work. He also inquired about deep tissue massage and I recommended a few good therapist for him. We also discussed possibility of high level PT once he is [place] again. All of his questions were answered. He was provided this plan in writing. He will follow-up with me PRN.    PROCEDURE NOTE    Diagnosis: De Quervains Tenosynovitis  Procedure: Injection to the right first extensor compartment    A time out was performed and risks and benefits of procedure explained to patient including risks of infection, bleeding, pain, or damage to surrounding tissues. All questions answered. Informed consent obtained prior to proceeding. The proper areas were marked and prepped in sterile fashion. Using a 27 g 1.25 inch needle, a 6 cc mixture of depo medrol 1cc (40mg) and 1% lidocaine was injected evenly into the right wrist first extensor compartment. None to minimal bleeding noted. Bandages were applied. ER and post injection instructions given.       Dagmar Jade M.D.  Physical Medicine and Rehab

## 2020-01-01 NOTE — PATIENT INSTRUCTIONS
Hip Abduction Progression:      Lateral control - This is one of the most common problems with runners and can contribute to many different injuries and wasted energy. Improving hip strength will help significantly. As your hips get stronger, think about actively squeezing your glutes when you run - that will help get these muscles firing. Do only 1 of these exercises per workout and advance to the next exercise once you can do the recommended amount of reps.     1. Clam shells: 3 x 30  Stay perpendicular to ground, knees bent 90 degrees, feet lined up even with low back. Brace core & squeeze glutes throughout the exercise. Lift top knee only as far as you can without rolling backwards.           2. Side lying leg lift (keep leg slightly extended): 3 x 15      3. Seven way hips: Start with 3 x 2 - 3 reps and progress to 3 x 8 - 10 reps. Follow this link:  https://www.OnStateube.com/watch?v=YdenOdoz-MI      4. Standing hip hikes: 3 x 20  (bonus points: hold core tight, good posture with weight on forefoot, and toe yoga)          5. Gali hip exercises: 3 x 8 - 12 (Start with 8 reps. Once you build up to 12, use a tighter band.)    A. Theraband pulling straight out to side.             B. Theraband pulling 45 degrees posterior.            C. Standing hip rotations with resistance:  https://www.OnStateube.com/watch?v=gfHmUIuDmG0          6. Monster Walks: 3 x 30 - 60 seconds. See video: https://www.youtube.com/watch?t=bfM616EYLyh  You could also do monster walks going around a small square with 5 to 10 foot sides. Go around it in each direction.    7. Standing Clamshells: 3 x 8 - 12 reps  https://www.youSmartCrowdsube.com/watch?v=MPBfPHdY7GL    Mobilization:     Its generally a good idea to assess yourself for hotspots once a month by briefly mobilizing everything with a  or foam roller, and then spend several minutes every other day of the week, for 4 - 6 weeks working on those painful spots using thumbs, foam roll,  rolling pins, lacrosse ball, etc. If it hurts, then you need to do it. Once it no longer hurts, take a break on that area for a few months.       Quadriceps Mobilization:    Spend about 30 - 60 seconds on the whole quad, hitting the outside, middle, and inside portions.      Suprapatellar Pouch Mobilization:    Lying on your stomach, place Lacrosse ball right above knee cap while leg is straight. Then bend and straighten the knee 10 times. Turn your leg inward and place ball toward outer corner of kneecap and repeat for the lateral tissues, do the opposite to get the medial tissues.        Video of this process:  https://www.FunPuntos.com/watch?v=DRBqWgymh2M

## 2020-01-10 ENCOUNTER — OFFICE VISIT (OUTPATIENT)
Dept: PHYSICAL MEDICINE AND REHAB | Facility: CLINIC | Age: 45
End: 2020-01-10

## 2020-01-10 VITALS
HEIGHT: 73 IN | WEIGHT: 260 LBS | BODY MASS INDEX: 34.46 KG/M2 | HEART RATE: 74 BPM | DIASTOLIC BLOOD PRESSURE: 87 MMHG | SYSTOLIC BLOOD PRESSURE: 133 MMHG | RESPIRATION RATE: 14 BRPM

## 2020-01-10 DIAGNOSIS — M46.00 SPINAL ENTHESOPATHY: ICD-10-CM

## 2020-01-10 DIAGNOSIS — M47.816 SPONDYLOSIS OF LUMBAR REGION WITHOUT MYELOPATHY OR RADICULOPATHY: ICD-10-CM

## 2020-01-10 DIAGNOSIS — M53.82 MUSCULOSKELETAL DISORDER OF THE SUBOCCIPITAL: ICD-10-CM

## 2020-01-10 DIAGNOSIS — M46.1 SACROILIITIS: Primary | ICD-10-CM

## 2020-01-10 DIAGNOSIS — M79.18 DIFFUSE MYOFASCIAL PAIN SYNDROME: ICD-10-CM

## 2020-01-10 PROCEDURE — 99213 OFFICE O/P EST LOW 20 MIN: CPT | Mod: PBBFAC,25 | Performed by: PHYSICAL MEDICINE & REHABILITATION

## 2020-01-10 PROCEDURE — 20552 PR INJECT TRIGGER POINT, 1 OR 2: ICD-10-PCS | Mod: S$PBB,,, | Performed by: PHYSICAL MEDICINE & REHABILITATION

## 2020-01-10 PROCEDURE — 99999 PR PBB SHADOW E&M-EST. PATIENT-LVL III: CPT | Mod: PBBFAC,,, | Performed by: PHYSICAL MEDICINE & REHABILITATION

## 2020-01-10 PROCEDURE — 99214 OFFICE O/P EST MOD 30 MIN: CPT | Mod: 25,S$PBB,, | Performed by: PHYSICAL MEDICINE & REHABILITATION

## 2020-01-10 PROCEDURE — 20552 NJX 1/MLT TRIGGER POINT 1/2: CPT | Mod: S$PBB,,, | Performed by: PHYSICAL MEDICINE & REHABILITATION

## 2020-01-10 PROCEDURE — 20552 NJX 1/MLT TRIGGER POINT 1/2: CPT | Mod: PBBFAC | Performed by: PHYSICAL MEDICINE & REHABILITATION

## 2020-01-10 PROCEDURE — 99214 PR OFFICE/OUTPT VISIT, EST, LEVL IV, 30-39 MIN: ICD-10-PCS | Mod: 25,S$PBB,, | Performed by: PHYSICAL MEDICINE & REHABILITATION

## 2020-01-10 PROCEDURE — 99999 PR PBB SHADOW E&M-EST. PATIENT-LVL III: ICD-10-PCS | Mod: PBBFAC,,, | Performed by: PHYSICAL MEDICINE & REHABILITATION

## 2020-01-10 RX ORDER — METHYLPREDNISOLONE ACETATE 40 MG/ML
40 INJECTION, SUSPENSION INTRA-ARTICULAR; INTRALESIONAL; INTRAMUSCULAR; SOFT TISSUE
Status: COMPLETED | OUTPATIENT
Start: 2020-01-10 | End: 2020-01-10

## 2020-01-10 RX ADMIN — METHYLPREDNISOLONE ACETATE 40 MG: 40 INJECTION, SUSPENSION INTRA-ARTICULAR; INTRALESIONAL; INTRAMUSCULAR; SOFT TISSUE at 11:01

## 2020-01-10 NOTE — PROGRESS NOTES
PM&R CLINIC NOTE    Chief Complaint   Patient presents with    Back Pain     lower back pian, on the left       HPI: This is a 44 y.o.  male being seen in clinic today for new spasms in his left low back and right shoulder.  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  He reports no major injury, noticed spasms after a workup a few days ago.  He denies numbness, tingling, or weakness into his extremities. He has some issues with his right knee as well (heavy and achy at times)-Dr Sherman operated a few years ago.     Review of Systems:     General- denies lethargy, weight change, fever, chills  Head/neck- denies swallowing difficulties  ENT- denies hearing changes  Cardiovascular-denies chest pain  Pulmonary- denies shortness of breath  GI- denies constipation or bowel incontinence  - denies bladder incontinence  Skin- denies wounds or rashes  Musculoskeletal- denies weakness, +pain  Neurologic- denies numbness and tingling  Psychiatric- denies depressive or psychotic features, denies anxiety  Lymphatic-denies swelling  Endocrine- denies hypoglycemic symptoms/DM history    Physical Examination:  General: Well developed, well nourished male, NAD    Spinal Examination: CERVICAL  Active ROM is within normal limits.  Inspection: No deformity of spinal alignment.   ttp and tight at trapezius with local twitch    Spinal Examination: LUMBAR or THORACIC  Active ROM is within normal limits.  Inspection: No deformity of spinal alignment.   Tight and tender at left quadratus lumborum, ttp at si joint on left    Bilateral Upper and Lower Extremities:  Shoulder/Elbow/Wrist/Hand ROM wnl, rot cuff weakness-worse on right  Hip/Knee/Ankle ROM wnl   Bilateral Extremities show normal capillary refill.  No signs of cyanosis, rubor, edema, skin changes, or dysvascular changes of appendages.  Nails appear intact.    Neurological Exam:  Cranial Nerves:  II-XII grossly  intact    No focal atrophy is noted of either upper or lower extremity.  Gait: Narrow base and good arm swing.    IMPRESSION/PLAN: This is a 44 y.o.  male with Lumbar DDD/DJD,  Sacroiliitis, right rotator cuff weakness, myofascial pain    1. Continue activity, exercise routine   2. Cont NSAIDs prn  3. cont massage therapy  4. Trigger pt injections today  5. Cont si belt for support    Kelley Baltazar M.D.  Physical Medicine and Rehab      PROCEDURE NOTE    Diagnosis: Myofascial pain  Procedure: trigger point injections to right trapezius, left quadratus lumborum  Risks and benefits of procedure explained to patient including risks of infection, bleeding, pain, or damage to surrounding tissues. All questions answered. Informed consent obtained prior to proceeding. Areas marked and prepped in sterile fashion. Using a 27g 1.25inch needle, a 10 cc mixture of depo medrol 1cc (40mg) and 1% lidocaine was injected evenly into the above mentioned muscles. None to minimal bleeding noted. ER and post injection instructions given.    Kelley Baltazar M.D.

## 2020-05-01 ENCOUNTER — PATIENT OUTREACH (OUTPATIENT)
Dept: ADMINISTRATIVE | Facility: OTHER | Age: 45
End: 2020-05-01

## 2020-05-04 ENCOUNTER — OFFICE VISIT (OUTPATIENT)
Dept: PHYSICAL MEDICINE AND REHAB | Facility: CLINIC | Age: 45
End: 2020-05-04

## 2020-05-04 VITALS
BODY MASS INDEX: 35.12 KG/M2 | DIASTOLIC BLOOD PRESSURE: 96 MMHG | SYSTOLIC BLOOD PRESSURE: 148 MMHG | RESPIRATION RATE: 16 BRPM | HEIGHT: 73 IN | WEIGHT: 265 LBS | HEART RATE: 81 BPM

## 2020-05-04 DIAGNOSIS — M53.82 MUSCULOSKELETAL DISORDER OF THE SUBOCCIPITAL: ICD-10-CM

## 2020-05-04 DIAGNOSIS — M46.00 SPINAL ENTHESOPATHY: ICD-10-CM

## 2020-05-04 DIAGNOSIS — M51.36 DDD (DEGENERATIVE DISC DISEASE), LUMBAR: ICD-10-CM

## 2020-05-04 DIAGNOSIS — M47.816 SPONDYLOSIS OF LUMBAR REGION WITHOUT MYELOPATHY OR RADICULOPATHY: ICD-10-CM

## 2020-05-04 PROCEDURE — 20552 PR INJECT TRIGGER POINT, 1 OR 2: ICD-10-PCS | Mod: S$PBB,,, | Performed by: PHYSICAL MEDICINE & REHABILITATION

## 2020-05-04 PROCEDURE — 99999 PR PBB SHADOW E&M-EST. PATIENT-LVL III: CPT | Mod: PBBFAC,,, | Performed by: PHYSICAL MEDICINE & REHABILITATION

## 2020-05-04 PROCEDURE — 99214 OFFICE O/P EST MOD 30 MIN: CPT | Mod: 25,S$PBB,, | Performed by: PHYSICAL MEDICINE & REHABILITATION

## 2020-05-04 PROCEDURE — 99214 PR OFFICE/OUTPT VISIT, EST, LEVL IV, 30-39 MIN: ICD-10-PCS | Mod: 25,S$PBB,, | Performed by: PHYSICAL MEDICINE & REHABILITATION

## 2020-05-04 PROCEDURE — 99213 OFFICE O/P EST LOW 20 MIN: CPT | Mod: PBBFAC | Performed by: PHYSICAL MEDICINE & REHABILITATION

## 2020-05-04 PROCEDURE — 99999 PR PBB SHADOW E&M-EST. PATIENT-LVL III: ICD-10-PCS | Mod: PBBFAC,,, | Performed by: PHYSICAL MEDICINE & REHABILITATION

## 2020-05-04 PROCEDURE — 20552 NJX 1/MLT TRIGGER POINT 1/2: CPT | Mod: S$PBB,,, | Performed by: PHYSICAL MEDICINE & REHABILITATION

## 2020-05-04 PROCEDURE — 20552 NJX 1/MLT TRIGGER POINT 1/2: CPT | Mod: PBBFAC | Performed by: PHYSICAL MEDICINE & REHABILITATION

## 2020-05-04 RX ORDER — METHYLPREDNISOLONE ACETATE 40 MG/ML
40 INJECTION, SUSPENSION INTRA-ARTICULAR; INTRALESIONAL; INTRAMUSCULAR; SOFT TISSUE
Status: COMPLETED | OUTPATIENT
Start: 2020-05-04 | End: 2020-05-04

## 2020-05-04 RX ADMIN — METHYLPREDNISOLONE ACETATE 40 MG: 40 INJECTION, SUSPENSION INTRA-ARTICULAR; INTRALESIONAL; INTRAMUSCULAR; SOFT TISSUE at 08:05

## 2020-05-04 NOTE — PROGRESS NOTES
PM&R CLINIC NOTE    Chief Complaint   Patient presents with    Back Pain     left side the worst       HPI: This is a 44 y.o.  male being seen in clinic today for repeated left low back pain that started last week while on a job.  He has some tightness in his shoulders as well.  He was last seen in January for similar issues.  He hasn't been able to workout as much, but would  like to.  Review of Systems:     General- denies lethargy, weight change, fever, chills  Head/neck- denies swallowing difficulties  ENT- denies hearing changes  Cardiovascular-denies chest pain  Pulmonary- denies shortness of breath  GI- denies constipation or bowel incontinence  - denies bladder incontinence  Skin- denies wounds or rashes  Musculoskeletal- denies weakness, +pain  Neurologic- denies numbness and tingling  Psychiatric- denies depressive or psychotic features, denies anxiety  Lymphatic-denies swelling  Endocrine- denies hypoglycemic symptoms/DM history    Physical Examination:  General: Well developed, well nourished male, NAD  HEENT: NCAT EOMI  Pulmonary: Normal respirations    Spinal Examination: CERVICAL  Active ROM is within normal limits.  Inspection: No deformity of spinal alignment.   ttp and tight at bilateral trapezius with local twitch    Spinal Examination: LUMBAR or THORACIC  Active ROM is within normal limits.  Inspection: No deformity of spinal alignment.   Tight and tender at left quadratus lumborum, ttp at si joint on left, mild tightness at right quadratus lumborum    Bilateral Upper and Lower Extremities:  Shoulder/Elbow/Wrist/Hand ROM wnl  Hip/Knee/Ankle ROM wnl   Bilateral Extremities show normal capillary refill.  No signs of cyanosis, rubor, edema, skin changes, or dysvascular changes of appendages.  Nails appear intact.    Neurological Exam:  Cranial Nerves:  II-XII grossly intact    No focal atrophy is noted of either upper or lower extremity.  Gait: Narrow base and good arm swing.    IMPRESSION/PLAN:  This is a 44 y.o.  male with Lumbar DDD/DJD,  Mild Sacroiliitis, myofascial pain    1. Continue activity, exercise routine, stretch/yoga  2. Cont NSAIDs prn  3. cont massage therapy  4. Trigger pt injections today  5. Cont si belt for support    Kelley Baltazar M.D.  Physical Medicine and Rehab      PROCEDURE NOTE    Diagnosis: Myofascial pain  Procedure: trigger point injections to bilateral trapezius, bilateral quadratus lumborum  Risks and benefits of procedure explained to patient including risks of infection, bleeding, pain, or damage to surrounding tissues. All questions answered. Informed consent obtained prior to proceeding. Areas marked and prepped in sterile fashion. Using a 27g 1.25inch needle, a 6cc mixture of depo medrol 1cc (40mg) and 1% lidocaine was injected evenly into the above mentioned muscles. None to minimal bleeding noted. ER and post injection instructions given.    Kelley Baltazar M.D.

## 2020-06-16 ENCOUNTER — OFFICE VISIT (OUTPATIENT)
Dept: INTERNAL MEDICINE | Facility: CLINIC | Age: 45
End: 2020-06-16

## 2020-06-16 VITALS
HEIGHT: 73 IN | BODY MASS INDEX: 35.33 KG/M2 | DIASTOLIC BLOOD PRESSURE: 94 MMHG | HEART RATE: 83 BPM | WEIGHT: 266.56 LBS | SYSTOLIC BLOOD PRESSURE: 122 MMHG | OXYGEN SATURATION: 98 % | TEMPERATURE: 97 F

## 2020-06-16 DIAGNOSIS — J03.90 TONSILLITIS: Primary | ICD-10-CM

## 2020-06-16 LAB — GROUP A STREP, MOLECULAR: NEGATIVE

## 2020-06-16 PROCEDURE — 99999 PR PBB SHADOW E&M-EST. PATIENT-LVL III: CPT | Mod: PBBFAC,,, | Performed by: PHYSICIAN ASSISTANT

## 2020-06-16 PROCEDURE — 99213 OFFICE O/P EST LOW 20 MIN: CPT | Mod: PBBFAC | Performed by: PHYSICIAN ASSISTANT

## 2020-06-16 PROCEDURE — 99214 PR OFFICE/OUTPT VISIT, EST, LEVL IV, 30-39 MIN: ICD-10-PCS | Mod: S$PBB,,, | Performed by: PHYSICIAN ASSISTANT

## 2020-06-16 PROCEDURE — 87651 STREP A DNA AMP PROBE: CPT

## 2020-06-16 PROCEDURE — 99999 PR PBB SHADOW E&M-EST. PATIENT-LVL III: ICD-10-PCS | Mod: PBBFAC,,, | Performed by: PHYSICIAN ASSISTANT

## 2020-06-16 PROCEDURE — 99214 OFFICE O/P EST MOD 30 MIN: CPT | Mod: S$PBB,,, | Performed by: PHYSICIAN ASSISTANT

## 2020-06-16 NOTE — PROGRESS NOTES
Subjective:      Patient ID: Pradeep Aranda is a 44 y.o. male.    Chief Complaint: Sore Throat    Sore Throat   This is a new problem. Progression since onset: 3 days. There has been no fever. The pain is moderate. Associated symptoms include congestion. Pertinent negatives include no abdominal pain, coughing, diarrhea, drooling, ear discharge, ear pain, headaches, hoarse voice, plugged ear sensation, neck pain, shortness of breath, stridor, swollen glands or vomiting. Associated symptoms comments: Itchy watery eyes. He has had no exposure to strep or mono. He has tried gargles and NSAIDs for the symptoms.     Patient Active Problem List   Diagnosis    DDD (degenerative disc disease), lumbar    Calcium oxalate renal stones    Inflammation of sacroiliac joint    Insomnia    DJD (degenerative joint disease), lumbar    Rotator cuff impingement syndrome of right shoulder    Arm weakness-rotator cuff weakness    Hoffa's fat pad disease    Meniscal cyst    Chondromalacia of both patellae    Patellar tendonitis of both knees    Renal stones    Patellar tracking disorder of right knee    Pterygium of eye    Toenail fungus    Ureteral calculus         Review of Systems   Constitutional: Negative for activity change, appetite change, chills, diaphoresis, fatigue, fever and unexpected weight change.   HENT: Positive for congestion, postnasal drip, rhinorrhea and sore throat. Negative for dental problem, drooling, ear discharge, ear pain, hoarse voice, sinus pressure, sinus pain, sneezing and tinnitus.    Eyes: Positive for discharge and itching.   Respiratory: Negative for cough, chest tightness, shortness of breath and stridor.    Cardiovascular: Negative for chest pain.   Gastrointestinal: Negative for abdominal pain, diarrhea and vomiting.   Musculoskeletal: Positive for arthralgias and myalgias. Negative for neck pain and neck stiffness.   Skin: Negative for rash.   Neurological: Negative for  "dizziness and headaches.     Objective:   BP (!) 122/94 (BP Location: Right arm, Patient Position: Sitting, BP Method: Large (Manual))   Pulse 83   Temp 97.4 °F (36.3 °C) (Tympanic)   Ht 6' 1" (1.854 m)   Wt 120.9 kg (266 lb 8.6 oz)   SpO2 98%   BMI 35.17 kg/m²     Physical Exam  Vitals signs reviewed.   Constitutional:       General: He is not in acute distress.     Appearance: He is well-developed and normal weight. He is not ill-appearing, toxic-appearing or diaphoretic.   HENT:      Head: Normocephalic and atraumatic.      Right Ear: External ear normal.      Left Ear: External ear normal.      Nose: Mucosal edema present.      Right Turbinates: Enlarged and swollen.      Left Turbinates: Enlarged and swollen.      Mouth/Throat:      Lips: Pink.      Mouth: Mucous membranes are moist.      Dentition: Abnormal dentition.      Pharynx: Posterior oropharyngeal erythema present. No pharyngeal swelling.      Tonsils: No tonsillar exudate. 1+ on the right. 1+ on the left.   Eyes:      Conjunctiva/sclera: Conjunctivae normal.      Pupils: Pupils are equal, round, and reactive to light.   Neck:      Musculoskeletal: Normal range of motion and neck supple.   Cardiovascular:      Rate and Rhythm: Normal rate and regular rhythm.      Heart sounds: Normal heart sounds. No murmur. No friction rub. No gallop.    Pulmonary:      Effort: Pulmonary effort is normal. No respiratory distress.      Breath sounds: Normal breath sounds. No wheezing or rales.   Chest:      Chest wall: No tenderness.   Abdominal:      Palpations: Abdomen is soft.   Musculoskeletal: Normal range of motion.   Lymphadenopathy:      Cervical: No cervical adenopathy.   Skin:     General: Skin is warm and dry.      Capillary Refill: Capillary refill takes less than 2 seconds.   Neurological:      Mental Status: He is alert and oriented to person, place, and time.   Psychiatric:         Behavior: Behavior normal.         Thought Content: Thought content " normal.         Judgment: Judgment normal.         Assessment:     1. Tonsillitis      Plan:   Tonsillitis  -     Group A Strep, Molecular    -continue gargles  Educational handout on over-the-counter medications and at-home conservative care, pertinent to the patients diagnosis today, was handed to the patient and discussed in detail.    Follow up if symptoms worsen or fail to improve.

## 2020-06-16 NOTE — PATIENT INSTRUCTIONS
Over-the-counter supportive tx for colds and cough:   -start flonase nasal spray (fluticasone) 1 spray each nostril once/day. Continue use for 2-3 weeks.   -Try OTC saline nasal spray a few times a day or nedi-pot using warm filtered water    - refrain from smoking, drink plenty of fluids, hot tea with honey, rest, take medications as prescribed, and use a humidifier or steam in the bathroom.   -Shower in the morning and evening to wash away any allergens and help reduce the production of mucus.   - Zinc lozenge, Emergen-C, or Airborne,  to boost immune system.     -No more than 10 in 24 hours.  -Cepacol sore throat/ cough drops  -Take tylenol or Advil for fever, sore throat, and muscle aches.  -warm salt water gargles or Listerine gargles for sore throat frequently throughout the day.  - Try OTC Mucinex (guafenesin) for cough with thick mucous.   -DM is for dextromethorphan. This is a cough suppressant.   -Phenylephrine/pseudophedrine or anything labeled with a D after it is for congestion.   Avoid decongestants if diagnosed with hypertension or arrhythmias. To avoid  rebound congestion, refrain from taking decongestant for longer than 5 day.    -For prevention,extremely important to quit smoking, get annual influenza vaccines, reduce exposure to air pollution, and to frequently wash hands to avoid spread of infection.       Viral Pharyngitis (Sore Throat)    You (or your child, if your child is the patient) have pharyngitis (sore throat). This infection is caused by a virus. It can cause throat pain that is worse when swallowing, aching all over, headache, and fever. The infection may be spread by coughing, kissing, or touching others after touching your mouth or nose. Antibiotic medications do not work against viruses, so they are not used for treating this condition.  Home care  · If your symptoms are severe, rest at home. Return to work or school when you feel well enough.   · Drink plenty of fluids to avoid  dehydration.  · For children: Use acetaminophen for fever, fussiness or discomfort. In infants over six months of age, you may use ibuprofen instead of acetaminophen. (NOTE: If your child has chronic liver or kidney disease or ever had a stomach ulcer or GI bleeding, talk with your doctor before using these medicines.) (NOTE: Aspirin should never be used in anyone under 18 years of age who is ill with a fever. It may cause severe liver damage.)   · For adults: You may use acetaminophen or ibuprofen to control pain or fever, unless another medicine was prescribed for this. (NOTE: If you have chronic liver or kidney disease or ever had a stomach ulcer or GI bleeding, talk with your doctor before using these medicines.)  · Throat lozenges or numbing throat sprays can help reduce pain. Gargling with warm salt water will also help reduce throat pain. For this, dissolve 1/2 teaspoon of salt in 1 glass of warm water. To help soothe a sore throat, children can sip on juice or a popsicle. Children 5 years and older can also suck on a lollipop or hard candy.  · Avoid salty or spicy foods, which can be irritating to the throat.  Follow-up care  Follow up with your healthcare provider or our staff if you are not improving over the next week.  When to seek medical advice  Call your healthcare provider right away if any of these occur:  · Fever as directed by your doctor.  For children, seek care if:  ¨ Your child is of any age and has repeated fevers above 104°F (40°C).  ¨ Your child is younger than 2 years of age and has a fever of 100.4°F (38°C) that continues for more than 1 day.  ¨ Your child is 2 years old or older and has a fever of 100.4°F (38°C) that continues for more than 3 days.  · New or worsening ear pain, sinus pain, or headache  · Painful lumps in the back of neck  · Stiff neck  · Lymph nodes are getting larger  · Inability to swallow liquids, excessive drooling, or inability to open mouth wide due to throat  pain  · Signs of dehydration (very dark urine or no urine, sunken eyes, dizziness)  · Trouble breathing or noisy breathing  · Muffled voice  · New rash  · Child appears to be getting sicker  Date Last Reviewed: 4/13/2015 © 2000-2017 [employer]. [address] [place] [address], [place] [address]. All rights reserved. This information is not intended as a substitute for professional medical care. Always follow your healthcare professional's instructions.        Pharyngitis (Sore Throat), Report Pending    Pharyngitis (sore throat) is often due to a virus. It can also be caused by the streptococcus, or strep, bacterium, often called strep throat. Both viral and strep infections can cause throat pain that is worse when swallowing, aching all over with headache, and fever. Both types of infections are contagious. They may be spread by coughing, kissing, or touching others after touching your mouth or nose.  A test has been done to find out whether you (or your child, if your child is the patient) have strep throat. Call this facility or your healthcare provider if you were not given your test results. If the test is positive for strep infection, you will need to take antibiotic medicines. A prescription can be called into your pharmacy at that time. If the test is negative, you probably have a viral pharyngitis. This does not need to be treated with antibiotics. Until you receive the results of the strep test, you should stay home from work. If your child is being tested, he or she should stay home from school.  Home care  · Rest at home. Drink plenty of fluids so you won't get dehydrated.  · If the test is positive for strep, don't go to work or school for the first 2 days of taking the antibiotics. After this time, you will not be contagious. You can then return to work or school if you are feeling better.   · Take the antibiotic medicine for the full 10 days, even if you feel better. This is very important to  make sure the infection is treated. It is also important to prevent drug-resistant germs from developing. If you were given an antibiotic shot, you won't need more antibiotics.  · For children: Use acetaminophen for fever, fussiness, or discomfort. In infants older than 6 months of age, you may use ibuprofen instead of acetaminophen. Talk with your child's healthcare provider before giving these medicines if your child has chronic liver or kidney disease or ever had a stomach ulcer or GI bleeding. Never give aspirin to a child under 18 years of age who is ill with a fever. It may cause severe liver damage.  · For adults: Use acetaminophen or ibuprofen to control pain or fever, unless another medicine was prescribed for this. Talk with your healthcare provider before taking these medicines if you have chronic liver or kidney disease or ever had a stomach ulcer or GI bleeding.  · Use throat lozenges or numbing throat sprays to help reduce pain. Gargling with warm salt water will also help reduce throat pain. For this, dissolve 1/2 teaspoon of salt in 1 glass of warm water. To help soothe a sore throat, children can sip on juice or a popsicle. Children 5 years and older can also suck on a lollipop or hard candy.  · Don't eat salty or spicy foods. These can irritate the throat.  Follow-up care  Follow up with your healthcare provider or our staff if you don't get better over the next week.  When to seek medical advice  Call your healthcare provider right away if any of these occur:  · Fever as directed by your healthcare provider. For children, seek care if:  ¨ Your child is of any age and has repeated fevers above 104°F (40°C).  ¨ Your child is younger than 2 years of age and has a fever of 100.4°F (38°C) that continues for more than 1 day.  ¨ Your child is 2 years old or older and has a fever of 100.4°F (38°C) that continues for more than 3 days.  · New or worsening ear pain, sinus pain, or headache  · Painful lumps  in the back of neck  · Stiff neck  · Lymph nodes are getting larger  · Inability to swallow liquids, excessive drooling, or inability to open mouth wide due to throat pain  · Signs of dehydration (very dark urine or no urine, sunken eyes, dizziness)  · Trouble breathing or noisy breathing  · Muffled voice  · New rash  · Child appears to be getting sicker  Date Last Reviewed: 4/13/2015  © 5473-7450 [employer]. [address] [place] [address], [place] [address]. All rights reserved. This information is not intended as a substitute for professional medical care. Always follow your healthcare professional's instructions.

## 2020-07-29 ENCOUNTER — HOSPITAL ENCOUNTER (OUTPATIENT)
Dept: RADIOLOGY | Facility: HOSPITAL | Age: 45
Discharge: HOME | End: 2020-07-29
Attending: NURSE PRACTITIONER

## 2020-07-29 ENCOUNTER — OFFICE VISIT (OUTPATIENT)
Dept: INTERNAL MEDICINE | Facility: CLINIC | Age: 45
End: 2020-07-29

## 2020-07-29 VITALS
BODY MASS INDEX: 35.97 KG/M2 | SYSTOLIC BLOOD PRESSURE: 126 MMHG | TEMPERATURE: 99 F | HEIGHT: 73 IN | HEART RATE: 87 BPM | DIASTOLIC BLOOD PRESSURE: 74 MMHG | OXYGEN SATURATION: 98 % | WEIGHT: 271.38 LBS

## 2020-07-29 DIAGNOSIS — J20.9 ACUTE BRONCHITIS, UNSPECIFIED ORGANISM: Primary | ICD-10-CM

## 2020-07-29 DIAGNOSIS — J20.9 ACUTE BRONCHITIS, UNSPECIFIED ORGANISM: ICD-10-CM

## 2020-07-29 PROCEDURE — 99213 OFFICE O/P EST LOW 20 MIN: CPT | Mod: PBBFAC,25 | Performed by: NURSE PRACTITIONER

## 2020-07-29 PROCEDURE — 99214 PR OFFICE/OUTPT VISIT, EST, LEVL IV, 30-39 MIN: ICD-10-PCS | Mod: S$PBB,,, | Performed by: NURSE PRACTITIONER

## 2020-07-29 PROCEDURE — 99999 PR PBB SHADOW E&M-EST. PATIENT-LVL III: ICD-10-PCS | Mod: PBBFAC,,, | Performed by: NURSE PRACTITIONER

## 2020-07-29 PROCEDURE — 71046 XR CHEST PA AND LATERAL: ICD-10-PCS | Mod: 26,,, | Performed by: RADIOLOGY

## 2020-07-29 PROCEDURE — 99214 OFFICE O/P EST MOD 30 MIN: CPT | Mod: S$PBB,,, | Performed by: NURSE PRACTITIONER

## 2020-07-29 PROCEDURE — 99999 PR PBB SHADOW E&M-EST. PATIENT-LVL III: CPT | Mod: PBBFAC,,, | Performed by: NURSE PRACTITIONER

## 2020-07-29 PROCEDURE — 71046 X-RAY EXAM CHEST 2 VIEWS: CPT | Mod: TC

## 2020-07-29 PROCEDURE — 71046 X-RAY EXAM CHEST 2 VIEWS: CPT | Mod: 26,,, | Performed by: RADIOLOGY

## 2020-07-29 RX ORDER — ALBUTEROL SULFATE 90 UG/1
2 AEROSOL, METERED RESPIRATORY (INHALATION) EVERY 6 HOURS PRN
Qty: 18 G | Refills: 0 | Status: SHIPPED | OUTPATIENT
Start: 2020-07-29 | End: 2022-09-30

## 2020-07-29 RX ORDER — BENZONATATE 200 MG/1
200 CAPSULE ORAL 2 TIMES DAILY PRN
Qty: 20 CAPSULE | Refills: 0 | Status: SHIPPED | OUTPATIENT
Start: 2020-07-29 | End: 2020-08-08

## 2020-07-29 RX ORDER — PREDNISONE 20 MG/1
TABLET ORAL
Qty: 9 TABLET | Refills: 0 | Status: SHIPPED | OUTPATIENT
Start: 2020-07-29 | End: 2022-09-30 | Stop reason: ALTCHOICE

## 2020-07-29 NOTE — PROGRESS NOTES
Subjective:       Patient ID: Pradeep Aranda is a 45 y.o. male.    Chief Complaint: No chief complaint on file.    HPI        Past Medical History:   Diagnosis Date    Back pain     DDD (degenerative disc disease), lumbar     Hoffa's fat pad disease     Kidney stone on left side      Past Surgical History:   Procedure Laterality Date    COLONOSCOPY N/A 1/26/2016    Procedure: COLONOSCOPY;  Surgeon: Chance Ríos MD;  Location: [facility] ENDO;  Service: Endoscopy;  Laterality: N/A;    KNEE ARTHROSCOPY Bilateral 05/19/2016    none      REFRACTIVE SURGERY Bilateral 2006     Past Surgical History:   Procedure Laterality Date    COLONOSCOPY N/A 1/26/2016    Procedure: COLONOSCOPY;  Surgeon: Chance Ríos MD;  Location: [facility] ENDO;  Service: Endoscopy;  Laterality: N/A;    KNEE ARTHROSCOPY Bilateral 05/19/2016    none      REFRACTIVE SURGERY Bilateral 2006     Social History     Socioeconomic History    Marital status: Single     Spouse name: Not on file    Number of children: Not on file    Years of education: Not on file    Highest education level: Not on file   Occupational History    Not on file   Social Needs    Financial resource strain: Not on file    Food insecurity     Worry: Never true     Inability: Never true    Transportation needs     Medical: No     Non-medical: No   Tobacco Use    Smoking status: Never Smoker    Smokeless tobacco: Never Used   Substance and Sexual Activity    Alcohol use: Yes     Comment: Rare    Drug use: No    Sexual activity: Yes     Partners: Female   Lifestyle    Physical activity     Days per week: 6 days     Minutes per session: 90 min    Stress: Not at all   Relationships    Social connections     Talks on phone: More than three times a week     Gets together: More than three times a week     Attends Jain service: Not on file     Active member of club or organization: No     Attends meetings of clubs or organizations: Never      Relationship status: Never    Other Topics Concern    Not on file   Social History Narrative    2 dogs and 7 cats; No smokers in the household.     Review of patient's allergies indicates:   Allergen Reactions    Penicillins      Other reaction(s): Anaphylaxis     Current Outpatient Medications   Medication Sig    COD LIVER OIL ORAL Take 1 capsule by mouth once daily.    docosahexanoic acid/epa (FISH OIL ORAL) Take 1 capsule by mouth once daily.    naproxen sodium (ANAPROX) 220 MG tablet Take 220 mg by mouth as needed.     nystatin-triamcinolone (MYCOLOG II) cream Apply topically 2 (two) times daily.     No current facility-administered medications for this visit.            Review of Systems    Objective:      Physical Exam    Assessment:   There were no vitals filed for this visit.      No diagnosis found.    Plan:   There are no diagnoses linked to this encounter.      New medications:  Change in chronic medications:  Stop medications  Referrals  Labs/radiology-as above  Monitor   Interventions (non-medicinal)  Follow up  Time spent with patient

## 2020-07-29 NOTE — PROGRESS NOTES
Subjective:       Patient ID: Pradeep Aranda is a 45 y.o. male.    Chief Complaint: Cough    HPI    Cough x 1 month. Dry/hacking. No fever/sob/wheezing.  txed for tonsillitis with zpack and steroid ab a month ago   Took OTC cough meds with no relief. Drinks tons of water      Pt states that he has had trouble sleeping/breathing issues for over a decade . Sleep study was recommended but he states that he could not afford it due to having no insurance. He takes melatonin/benadryl to help with sleep. Does not help much. Awakens every 2-3 hrs. Has cpap at home and can't use due to never being properly evaluated and prescribed specific settings      Review of Systems   Constitutional: Negative for activity change, appetite change, chills, diaphoresis, fatigue, fever and unexpected weight change.   HENT: Negative for congestion, ear pain, postnasal drip, rhinorrhea, sinus pressure, sinus pain, sneezing, sore throat, tinnitus, trouble swallowing and voice change.    Eyes: Negative for photophobia, pain and visual disturbance.   Respiratory: Positive for cough. Negative for chest tightness, shortness of breath and wheezing.    Cardiovascular: Negative for chest pain, palpitations and leg swelling.   Gastrointestinal: Negative for abdominal distention, abdominal pain, constipation, diarrhea, nausea and vomiting.   Genitourinary: Negative for decreased urine volume, difficulty urinating, dysuria, flank pain, frequency, hematuria and urgency.   Musculoskeletal: Negative for arthralgias, back pain, joint swelling, neck pain and neck stiffness.   Allergic/Immunologic: Negative for immunocompromised state.   Neurological: Negative for dizziness, tremors, seizures, syncope, facial asymmetry, speech difficulty, weakness, light-headedness, numbness and headaches.   Hematological: Negative for adenopathy. Does not bruise/bleed easily.   Psychiatric/Behavioral: Negative for confusion and sleep disturbance.       Objective:       Physical Exam  HENT:      Head: Normocephalic and atraumatic.      Right Ear: Tympanic membrane normal.      Left Ear: Tympanic membrane normal.   Eyes:      Conjunctiva/sclera: Conjunctivae normal.   Neck:      Musculoskeletal: Normal range of motion and neck supple.   Cardiovascular:      Rate and Rhythm: Normal rate and regular rhythm.      Heart sounds: Normal heart sounds.   Pulmonary:      Effort: Pulmonary effort is normal.      Breath sounds: Normal breath sounds.   Abdominal:      General: Bowel sounds are normal.      Palpations: Abdomen is soft.   Musculoskeletal: Normal range of motion.   Skin:     General: Skin is warm and dry.   Neurological:      Mental Status: He is alert and oriented to person, place, and time.         Assessment:     Vitals:    07/29/20 1127   BP: 126/74   Pulse: 87   Temp: 99 °F (37.2 °C)         1. Acute bronchitis, unspecified organism        Plan:   Acute bronchitis, unspecified organism  -     X-Ray Chest PA And Lateral; Future; Expected date: 07/29/2020        As above  Phone review and recommendations to follow   Highly advised sleep study. Pt states he would prioritize.

## 2022-03-23 ENCOUNTER — HOSPITAL ENCOUNTER (OUTPATIENT)
Dept: RADIOLOGY | Facility: HOSPITAL | Age: 47
Discharge: HOME | End: 2022-03-23
Attending: PEDIATRICS

## 2022-03-23 ENCOUNTER — OFFICE VISIT (OUTPATIENT)
Dept: INTERNAL MEDICINE | Facility: CLINIC | Age: 47
End: 2022-03-23

## 2022-03-23 ENCOUNTER — LAB VISIT (OUTPATIENT)
Dept: LAB | Facility: HOSPITAL | Age: 47
End: 2022-03-23
Attending: PEDIATRICS

## 2022-03-23 VITALS
DIASTOLIC BLOOD PRESSURE: 98 MMHG | SYSTOLIC BLOOD PRESSURE: 140 MMHG | HEART RATE: 62 BPM | OXYGEN SATURATION: 98 % | BODY MASS INDEX: 37.69 KG/M2 | HEIGHT: 73 IN | TEMPERATURE: 97 F | WEIGHT: 284.38 LBS

## 2022-03-23 DIAGNOSIS — Z13.6 ENCOUNTER FOR LIPID SCREENING FOR CARDIOVASCULAR DISEASE: ICD-10-CM

## 2022-03-23 DIAGNOSIS — G44.82 HEADACHE ASSOCIATED WITH SEXUAL ACTIVITY: ICD-10-CM

## 2022-03-23 DIAGNOSIS — R03.0 ELEVATED BP WITHOUT DIAGNOSIS OF HYPERTENSION: ICD-10-CM

## 2022-03-23 DIAGNOSIS — G44.82 ORGASMIC HEADACHE: Primary | ICD-10-CM

## 2022-03-23 DIAGNOSIS — Z13.220 ENCOUNTER FOR LIPID SCREENING FOR CARDIOVASCULAR DISEASE: ICD-10-CM

## 2022-03-23 DIAGNOSIS — G44.82 ORGASMIC HEADACHE: ICD-10-CM

## 2022-03-23 LAB
ANION GAP SERPL CALC-SCNC: 10 MMOL/L (ref 8–16)
BUN SERPL-MCNC: 20 MG/DL (ref 6–20)
CALCIUM SERPL-MCNC: 9.4 MG/DL (ref 8.7–10.5)
CHLORIDE SERPL-SCNC: 107 MMOL/L (ref 95–110)
CHOLEST SERPL-MCNC: 196 MG/DL (ref 120–199)
CHOLEST/HDLC SERPL: 4.1 {RATIO} (ref 2–5)
CO2 SERPL-SCNC: 24 MMOL/L (ref 23–29)
CREAT SERPL-MCNC: 1.1 MG/DL (ref 0.5–1.4)
EST. GFR  (AFRICAN AMERICAN): >60 ML/MIN/1.73 M^2
EST. GFR  (NON AFRICAN AMERICAN): >60 ML/MIN/1.73 M^2
GLUCOSE SERPL-MCNC: 101 MG/DL (ref 70–110)
HDLC SERPL-MCNC: 48 MG/DL (ref 40–75)
HDLC SERPL: 24.5 % (ref 20–50)
LDLC SERPL CALC-MCNC: 130 MG/DL (ref 63–159)
NONHDLC SERPL-MCNC: 148 MG/DL
POTASSIUM SERPL-SCNC: 4.5 MMOL/L (ref 3.5–5.1)
SODIUM SERPL-SCNC: 141 MMOL/L (ref 136–145)
TRIGL SERPL-MCNC: 90 MG/DL (ref 30–150)

## 2022-03-23 PROCEDURE — 99999 PR PBB SHADOW E&M-EST. PATIENT-LVL IV: ICD-10-PCS | Mod: PBBFAC,,, | Performed by: PEDIATRICS

## 2022-03-23 PROCEDURE — 36415 COLL VENOUS BLD VENIPUNCTURE: CPT | Performed by: PEDIATRICS

## 2022-03-23 PROCEDURE — 99214 PR OFFICE/OUTPT VISIT, EST, LEVL IV, 30-39 MIN: ICD-10-PCS | Mod: S$PBB,,, | Performed by: PEDIATRICS

## 2022-03-23 PROCEDURE — 99999 PR PBB SHADOW E&M-EST. PATIENT-LVL IV: CPT | Mod: PBBFAC,,, | Performed by: PEDIATRICS

## 2022-03-23 PROCEDURE — 70496 CT ANGIOGRAPHY HEAD: CPT | Mod: 26,,, | Performed by: RADIOLOGY

## 2022-03-23 PROCEDURE — 70496 CT ANGIOGRAPHY HEAD: CPT | Mod: TC

## 2022-03-23 PROCEDURE — 99214 OFFICE O/P EST MOD 30 MIN: CPT | Mod: S$PBB,,, | Performed by: PEDIATRICS

## 2022-03-23 PROCEDURE — 80048 BASIC METABOLIC PNL TOTAL CA: CPT | Performed by: PEDIATRICS

## 2022-03-23 PROCEDURE — 70496 CTA HEAD: ICD-10-PCS | Mod: 26,,, | Performed by: RADIOLOGY

## 2022-03-23 PROCEDURE — 25500020 PHARM REV CODE 255: Performed by: PEDIATRICS

## 2022-03-23 PROCEDURE — 99214 OFFICE O/P EST MOD 30 MIN: CPT | Mod: PBBFAC,25 | Performed by: PEDIATRICS

## 2022-03-23 PROCEDURE — 80061 LIPID PANEL: CPT | Performed by: PEDIATRICS

## 2022-03-23 RX ORDER — PROPRANOLOL HYDROCHLORIDE 60 MG/1
60 CAPSULE, EXTENDED RELEASE ORAL DAILY
Qty: 30 CAPSULE | Refills: 11 | Status: SHIPPED | OUTPATIENT
Start: 2022-03-23 | End: 2023-04-27 | Stop reason: SDUPTHER

## 2022-03-23 RX ORDER — INDOMETHACIN 25 MG/1
CAPSULE ORAL
Qty: 30 CAPSULE | Refills: 11 | Status: SHIPPED | OUTPATIENT
Start: 2022-03-23 | End: 2022-09-30 | Stop reason: ALTCHOICE

## 2022-03-23 RX ADMIN — IOHEXOL 100 ML: 350 INJECTION, SOLUTION INTRAVENOUS at 08:03

## 2022-03-23 NOTE — PROGRESS NOTES
Subjective:       Patient ID: Pradeep Aranda is a 46 y.o. male.    Chief Complaint: Migraine and Otalgia    Pradeep Aranda is a 46 y.o. male who presents to clinic for HA. Came on after orgasm, says he was debilitated, has had multiple other episodes that were not as severe. Neck became tight for the rest of the day, tried heat and ice. Also was slightly nauseated but it only lasted a short while. Has had ongoing L ear pain since this episode as well but uses qtips. Denies any coordination issues, visual disturbance, or BP checks    Review of Systems   Constitutional: Negative for activity change, appetite change, chills, diaphoresis, fatigue, fever and unexpected weight change.   HENT: Positive for ear pain. Negative for nasal congestion, hearing loss, mouth sores, nosebleeds, postnasal drip, rhinorrhea, sneezing, sore throat and trouble swallowing.    Eyes: Negative for photophobia, pain, discharge, redness and visual disturbance.   Respiratory: Negative for cough, chest tightness, shortness of breath, wheezing and stridor.    Cardiovascular: Negative for chest pain, palpitations and leg swelling.   Gastrointestinal: Negative for abdominal distention, blood in stool, constipation, diarrhea, nausea and vomiting.   Endocrine: Negative for polydipsia and polyuria.   Genitourinary: Negative for decreased urine volume, difficulty urinating, dysuria, flank pain, frequency, genital sores, hematuria and urgency.   Musculoskeletal: Positive for neck pain. Negative for arthralgias, back pain, joint swelling and neck stiffness.   Integumentary:  Negative for color change, pallor, rash and wound.   Neurological: Positive for headaches. Negative for dizziness, syncope, speech difficulty, weakness and light-headedness.   Hematological: Negative for adenopathy. Does not bruise/bleed easily.   Psychiatric/Behavioral: Negative for confusion, decreased concentration, dysphoric mood, hallucinations, sleep disturbance and  suicidal ideas. The patient is not nervous/anxious.    All other systems reviewed and are negative.        Objective:      Physical Exam  Vitals and nursing note reviewed.   Constitutional:       General: He is not in acute distress.     Appearance: He is well-developed.   HENT:      Right Ear: Tympanic membrane, ear canal and external ear normal. There is no impacted cerumen.      Ears:      Comments: Left ear canal raw from Qtip use, no OE or OM.  Neck:      Thyroid: No thyromegaly.      Vascular: No JVD.   Cardiovascular:      Rate and Rhythm: Normal rate and regular rhythm.      Heart sounds: Normal heart sounds. No murmur heard.  Pulmonary:      Effort: Pulmonary effort is normal. No respiratory distress.      Breath sounds: Normal breath sounds. No wheezing or rales.   Abdominal:      General: There is no distension.      Palpations: Abdomen is soft. There is no mass.      Tenderness: There is no abdominal tenderness. There is no guarding.   Musculoskeletal:      Right lower leg: No edema.      Left lower leg: No edema.   Lymphadenopathy:      Cervical: No cervical adenopathy.   Skin:     Capillary Refill: Capillary refill takes less than 2 seconds.      Findings: No rash.   Neurological:      General: No focal deficit present.      Mental Status: He is alert and oriented to person, place, and time.      Cranial Nerves: No cranial nerve deficit.      Coordination: Coordination normal.   Psychiatric:         Mood and Affect: Mood normal.         Behavior: Behavior normal.         Thought Content: Thought content normal.         Judgment: Judgment normal.         Assessment:       Problem List Items Addressed This Visit    None     Visit Diagnoses     Orgasmic headache    -  Primary    Relevant Orders    Basic Metabolic Panel (Completed)    Headache associated with sexual activity        Relevant Orders    CTA Head (Completed)    Encounter for lipid screening for cardiovascular disease        Relevant Orders     Lipid Panel (Completed)    Elevated BP without diagnosis of hypertension              Plan:     Orgasmic headache  -     Basic Metabolic Panel; Future; Expected date: 03/23/2022    Headache associated with sexual activity  -     CTA Head; Future; Expected date: 03/23/2022    Encounter for lipid screening for cardiovascular disease  -     Lipid Panel; Future; Expected date: 03/23/2022    Elevated BP without diagnosis of hypertension    Other orders  -     indomethacin (INDOCIN) 25 MG capsule; Take 1 30-60 minutes prior to activity prn  Dispense: 30 capsule; Refill: 11  -     propranoloL (INDERAL LA) 60 MG 24 hr capsule; Take 1 capsule (60 mg total) by mouth once daily.  Dispense: 30 capsule; Refill: 11    Use of indocin prior and daily propranolol for orgasmatic HA d/w patient. Propanolol also works for HTN. SE d/w patient. Urgent CTA was negative. D&E, weight loss. F/U in 6 months([place] 6 [place], he will send in B/P readings in 1 week.  Scribe Attestation:   I, Lionel Katz, am scribing for, and in the presence of, Dr. Jung Munoz Jr. I performed the above scribed service and the documentation accurately describes the services I performed. I attest to the accuracy of the note.    I, Dr. Jung Munoz Jr, reviewed documentation as scribed above. I personally performed the services described in this documentation.  I agree that the record reflects my personal performance and is accurate and complete. Jung Munoz Jr., MD.  03/23/2022

## 2022-09-30 ENCOUNTER — TELEPHONE (OUTPATIENT)
Dept: ORTHOPEDICS | Facility: CLINIC | Age: 47
End: 2022-09-30

## 2022-09-30 ENCOUNTER — HOSPITAL ENCOUNTER (OUTPATIENT)
Dept: RADIOLOGY | Facility: HOSPITAL | Age: 47
Discharge: HOME | End: 2022-09-30
Attending: STUDENT IN AN ORGANIZED HEALTH CARE EDUCATION/TRAINING PROGRAM

## 2022-09-30 ENCOUNTER — OFFICE VISIT (OUTPATIENT)
Dept: ORTHOPEDICS | Facility: CLINIC | Age: 47
End: 2022-09-30

## 2022-09-30 VITALS — WEIGHT: 296.31 LBS | BODY MASS INDEX: 39.09 KG/M2

## 2022-09-30 DIAGNOSIS — M25.471 RIGHT ANKLE SWELLING: ICD-10-CM

## 2022-09-30 DIAGNOSIS — M25.571 RIGHT ANKLE PAIN, UNSPECIFIED CHRONICITY: ICD-10-CM

## 2022-09-30 DIAGNOSIS — S93.411A SPRAIN OF CALCANEOFIBULAR LIGAMENT OF RIGHT ANKLE, INITIAL ENCOUNTER: Primary | ICD-10-CM

## 2022-09-30 DIAGNOSIS — M25.471 RIGHT ANKLE EFFUSION: ICD-10-CM

## 2022-09-30 DIAGNOSIS — M25.571 RIGHT ANKLE PAIN, UNSPECIFIED CHRONICITY: Primary | ICD-10-CM

## 2022-09-30 PROCEDURE — 99203 OFFICE O/P NEW LOW 30 MIN: CPT | Mod: S$PBB,,, | Performed by: STUDENT IN AN ORGANIZED HEALTH CARE EDUCATION/TRAINING PROGRAM

## 2022-09-30 PROCEDURE — 73610 XR ANKLE COMPLETE 3 VIEW RIGHT: ICD-10-PCS | Mod: 26,RT,, | Performed by: RADIOLOGY

## 2022-09-30 PROCEDURE — 99213 OFFICE O/P EST LOW 20 MIN: CPT | Mod: PBBFAC,PO | Performed by: STUDENT IN AN ORGANIZED HEALTH CARE EDUCATION/TRAINING PROGRAM

## 2022-09-30 PROCEDURE — 99999 PR PBB SHADOW E&M-EST. PATIENT-LVL III: CPT | Mod: PBBFAC,,, | Performed by: STUDENT IN AN ORGANIZED HEALTH CARE EDUCATION/TRAINING PROGRAM

## 2022-09-30 PROCEDURE — 97110 THERAPEUTIC EXERCISES: CPT | Mod: PBBFAC,PO,RT

## 2022-09-30 PROCEDURE — 73610 X-RAY EXAM OF ANKLE: CPT | Mod: 26,RT,, | Performed by: RADIOLOGY

## 2022-09-30 PROCEDURE — 99999 PR PBB SHADOW E&M-EST. PATIENT-LVL III: ICD-10-PCS | Mod: PBBFAC,,, | Performed by: STUDENT IN AN ORGANIZED HEALTH CARE EDUCATION/TRAINING PROGRAM

## 2022-09-30 PROCEDURE — 73610 X-RAY EXAM OF ANKLE: CPT | Mod: TC,FY,PO,RT

## 2022-09-30 PROCEDURE — 99203 PR OFFICE/OUTPT VISIT, NEW, LEVL III, 30-44 MIN: ICD-10-PCS | Mod: S$PBB,,, | Performed by: STUDENT IN AN ORGANIZED HEALTH CARE EDUCATION/TRAINING PROGRAM

## 2022-09-30 RX ORDER — DICLOFENAC SODIUM 75 MG/1
75 TABLET, DELAYED RELEASE ORAL 2 TIMES DAILY
Qty: 30 TABLET | Refills: 1 | Status: SHIPPED | OUTPATIENT
Start: 2022-09-30 | End: 2022-11-14 | Stop reason: SDUPTHER

## 2022-09-30 NOTE — PATIENT INSTRUCTIONS
Assessment:  Pradeep Aranda is a 47 y.o. male with a right ankle sprain following an inversion ankle injury, now about 3 weeks out.      Encounter Diagnoses   Name Primary?    Sprain of right ankle, initial encounter Yes    Right ankle swelling     Right ankle effusion       Plan:  XR reviewed today, no evidence of fracture, although these were not weight-bearing so an adequate evaluation of the mortise  The patient's history, clinical exam, and imaging findings are consistent with a right Grade II ATFL, Grade II CFL ankle sprain.   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    Follow-up: 4 weeks or sooner if there are any problems between now and then.    At least 15 minutes were spent developing, teaching, and performing a home exercise program.  A written summary was provided and all questions were answered.  This service was performed by Clark Treviño, Sports Medicine Assistant under direction of Aniket Pickett MD.  CPT [id number].     Thank you for choosing [facility] and Dr. Aniket Pickett for your orthopedic & sports medicine care. It is our goal to provide you with exceptional care that will help keep you healthy, active, and get you back in the game.    Please do not hesitate to reach out to us via email, phone, or Swirl[employer]  with any questions, concerns, or feedback.    If you are experiencing pain/discomfort ,or have questions after 5pm and would like to be connected to the [facility] on-call team, please call this number and specify which Sports Medicine provider is treating you: [phone]

## 2022-09-30 NOTE — TELEPHONE ENCOUNTER
Spoke with pt to confirm appt today at 2:30pm. Informed pt to arrive 30 min early for xrays. Pt verbalized understanding of appt date, time, and location.

## 2022-09-30 NOTE — PROGRESS NOTES
Patient ID: Pradeep Aranda  YOB: 1975  MRN: [id number]    Chief Complaint: Injury and Pain of the Right Ankle (Rolled ankle early Sept.)      Referred By: Self    Occupation: Security Work /       History of Present Illness: Pradeep Aranda is a right-hand dominant 47 y.o. male who presents today with 5/10 pain c/o right ankle injury. Patient states he was walking down some steps and rolled his right ankle with an inversion mechanism on 9/11/22. He had severe swelling and pain immediately. No history of prior ankle injuries.  He went to ED in [place], [place] where they did not see a fracture. Patient wore a boot for a week and a half after. He describes the pain as an intermittent throbbing with some sharp pains. He also notes tightness and swelling. The pain and swelling has improved since injury.  Patient states he gets some relief with brace and home stretches. He states that the pain is made worse when driving and walking.          Past Medical History:   Past Medical History:   Diagnosis Date    Back pain     DDD (degenerative disc disease), lumbar     Hoffa's fat pad disease     Kidney stone on left side      Past Surgical History:   Procedure Laterality Date    COLONOSCOPY N/A 1/26/2016    Procedure: COLONOSCOPY;  Surgeon: Chance Ríos MD;  Location: [facility];  Service: Endoscopy;  Laterality: N/A;    KNEE ARTHROSCOPY Bilateral 05/19/2016    none      REFRACTIVE SURGERY Bilateral 2006     Family History   Problem Relation Age of Onset    Diverticulitis Mother     Cataracts Father     Cancer Sister     Diabetes Mellitus Unknown     Hypertension Unknown     Allergies Unknown     Stroke Unknown     Cataracts Maternal Grandmother     Glaucoma Maternal Grandmother      Social History     Socioeconomic History    Marital status: Single   Tobacco Use    Smoking status: Never    Smokeless tobacco: Never   Substance and Sexual Activity    Alcohol use: Yes      Comment: Rare    Drug use: No    Sexual activity: Yes     Partners: Female   Social History Narrative    2 dogs and 7 cats; No smokers in the household.     Social Determinants of Health     Physical Activity: Insufficiently Active    Days of Exercise per Week: 3 days    Minutes of Exercise per Session: 40 min   Stress: No Stress Concern Present    Feeling of Stress : Not at all   Social Connections: Unknown    Frequency of Communication with Friends and Family: More than three times a week    Frequency of Social Gatherings with Friends and Family: More than three times a week    Active Member of Clubs or Organizations: No    Attends Club or Organization Meetings: Never    Marital Status:    Housing Stability: Unknown    Unable to Pay for Housing in the Last Year: Patient refused    Unstable Housing in the Last Year: Patient refused     Medication List with Changes/Refills   New Medications    DICLOFENAC (VOLTAREN) 75 MG EC TABLET    Take 1 tablet (75 mg total) by mouth 2 (two) times daily.   Current Medications    ALBUTEROL (PROVENTIL/VENTOLIN HFA) 90 MCG/ACTUATION INHALER    Inhale 2 puffs into the lungs every 6 (six) hours as needed (cough).    COD LIVER OIL ORAL    Take 1 capsule by mouth once daily.    DOCOSAHEXANOIC ACID/EPA (FISH OIL ORAL)    Take 1 capsule by mouth once daily.    NAPROXEN SODIUM (ANAPROX) 220 MG TABLET    Take 220 mg by mouth as needed.     NYSTATIN-TRIAMCINOLONE (MYCOLOG II) CREAM    Apply topically 2 (two) times daily.    PROPRANOLOL (INDERAL LA) 60 MG 24 HR CAPSULE    Take 1 capsule (60 mg total) by mouth once daily.   Discontinued Medications    INDOMETHACIN (INDOCIN) 25 MG CAPSULE    Take 1 30-60 minutes prior to activity prn    PREDNISONE (DELTASONE) 20 MG TABLET    Take 2 tablets with food for 3 days; then take one tablet with food for 2 days; then take 1/2 tablet with food for 2 days.     Review of patient's allergies indicates:   Allergen Reactions    Penicillins      Other  reaction(s): Anaphylaxis       Physical Exam:   Body mass index is 39.09 kg/m².    Physical Exam  Vitals reviewed.   Constitutional:       Appearance: Normal appearance.   HENT:      Head: Normocephalic and atraumatic.      Nose: Nose normal.   Eyes:      Extraocular Movements: Extraocular movements intact.      Conjunctiva/sclera: Conjunctivae normal.   Pulmonary:      Effort: Pulmonary effort is normal.   Skin:     General: Skin is warm and dry.   Neurological:      General: No focal deficit present.      Mental Status: He is alert and oriented to person, place, and time.   Psychiatric:         Mood and Affect: Mood normal.         Detailed MSK exam:     Right Ankle:    Inspection: Moderate swelling anterior, medial, and lateral ankle    Palpation tenderness: ATFL, CFL, anterior talocrural joint    Range of motion:  Full ROM equal bilaterally    Strength:  5/5 DF    5/5 PF    5/5 Inversion    5-/5 Eversion    Special Tests: Negative Talar Tilt    Grade 1 Anterior Drawer    N/V Exam:  Tibial:    Normal sensory (plantar foot)  Normal motor (FHL)    Sup Peroneal:  Normal sensory (dorsal foot)  Normal motor (Peroneals)            Deep Peroneal:  Normal sensory (1st web space) Normal motor (EHL)    Sural:   Normal sensory (lateral foot)   Saphenous:   Normal sensory (medial lower leg)   Normal pedal pulses, warm and well perfused with capillary refill < 2 sec   Calf soft and compressible    Imaging:  X-Ray Ankle Complete Right  Narrative: EXAMINATION:  XR ANKLE COMPLETE 3 VIEW RIGHT    CLINICAL HISTORY:  Pain in right ankle and joints of right foot    TECHNIQUE:  AP, lateral, and oblique images of the right ankle were performed.    COMPARISON:  None    FINDINGS:  There is significant soft tissue swelling seen anterior and lateral to the ankle.  No acute fracture or dislocation suggested.  A small dorsal calcaneal enthesophyte is noted.  Impression: As above    Electronically signed by: Maksim Bonilla  DO  Date:    09/30/2022  Time:    14:39      Relevant imaging results were reviewed and interpreted by me and per my read:  Overall normal appearing nonweightbearing radiographs of the right ankle, without any evidence of fracture, or joint space widening.    This was discussed with the patient and / or family today.       Patient Instructions   Assessment:  Pradeep Aranda is a 47 y.o. male with a right ankle sprain following an inversion ankle injury, now about 3 weeks out.      Encounter Diagnoses   Name Primary?    Sprain of right ankle, initial encounter Yes    Right ankle swelling     Right ankle effusion       Plan:  XR reviewed today, no evidence of fracture, although these were not weight-bearing so an adequate evaluation of the mortise  The patient's history, clinical exam, and imaging findings are consistent with a right Grade II ATFL, Grade II CFL ankle sprain.   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    Follow-up: 4 weeks or sooner if there are any problems between now and then.    At least 15 minutes were spent developing, teaching, and performing a home exercise program.  A written summary was provided and all questions were answered.  This service was performed by Clark Treviño, Sports Medicine Assistant under direction of Aniket  Piyush WAN.  CPT [id number].     Thank you for choosing [facility] and Dr. Aniket Pickett for your orthopedic & sports medicine care. It is our goal to provide you with exceptional care that will help keep you healthy, active, and get you back in the game.    Please do not hesitate to reach out to us via email, phone, or MyChart with any questions, concerns, or feedback.    If you are experiencing pain/discomfort ,or have questions after 5pm and would like to be connected to the [facility] on-call team, please call this number and specify which Sports Medicine provider is treating you: [phone]                   A copy of today's visit note has been sent to the referring provider.       Aniket Pickett MD  Primary Care Sports Medicine        Disclaimer: This note was prepared using a voice recognition system and is likely to have sound alike errors within the text.     I spent a total of 30 minutes on the day of the visit.This includes face to face time and non-face to face time preparing to see the patient (eg, review of tests), obtaining and/or reviewing separately obtained history, documenting clinical information in the electronic or other health record, independently interpreting results and communicating results to the patient/family/caregiver, or care coordinator.

## 2022-09-30 NOTE — PROGRESS NOTES
Patient ID: Pradeep Aranda  YOB: 1975  MRN: [id number]    Chief Complaint: Injury and Pain of the Right Ankle (Rolled ankle early Sept.)      Referred By: Self    ***School/Grade/Sport: ***    ***Occupation: Security Work      History of Present Illness: Pradeep Aranda is a right-hand dominant 47 y.o. male who presents today with 5/10 pain c/o right ankle injury. Patient states he was walking down some steps and rolled his right ankle outward on 9/11/22. He went to ED in [place], [place] where they did not see a fracture. Patient wore a boot for a week and a half after. He describes the pain as an intermittent throbbing with some sharp pains. He also notes tightness and swelling. The pain and swelling has improved since injury.  Patient states he gets some relief with brace and home stretches. He states that the pain is made worse when driving and walking.      The patient is active in none.      ***    Past Medical History:   Past Medical History:   Diagnosis Date    Back pain     DDD (degenerative disc disease), lumbar     Hoffa's fat pad disease     Kidney stone on left side      Past Surgical History:   Procedure Laterality Date    COLONOSCOPY N/A 1/26/2016    Procedure: COLONOSCOPY;  Surgeon: Chance Ríos MD;  Location: [facility];  Service: Endoscopy;  Laterality: N/A;    KNEE ARTHROSCOPY Bilateral 05/19/2016    none      REFRACTIVE SURGERY Bilateral 2006     Family History   Problem Relation Age of Onset    Diverticulitis Mother     Cataracts Father     Cancer Sister     Diabetes Mellitus Unknown     Hypertension Unknown     Allergies Unknown     Stroke Unknown     Cataracts Maternal Grandmother     Glaucoma Maternal Grandmother      Social History     Socioeconomic History    Marital status: Single   Tobacco Use    Smoking status: Never    Smokeless tobacco: Never   Substance and Sexual Activity    Alcohol use: Yes     Comment: Rare    Drug use: No    Sexual activity: Yes      Partners: Female   Social History Narrative    2 dogs and 7 cats; No smokers in the household.     Social Determinants of Health     Physical Activity: Insufficiently Active    Days of Exercise per Week: 3 days    Minutes of Exercise per Session: 40 min   Stress: No Stress Concern Present    Feeling of Stress : Not at all   Social Connections: Unknown    Frequency of Communication with Friends and Family: More than three times a week    Frequency of Social Gatherings with Friends and Family: More than three times a week    Active Member of Clubs or Organizations: No    Attends Club or Organization Meetings: Never    Marital Status:    Housing Stability: Unknown    Unable to Pay for Housing in the Last Year: Patient refused    Unstable Housing in the Last Year: Patient refused     Medication List with Changes/Refills   Current Medications    ALBUTEROL (PROVENTIL/VENTOLIN HFA) 90 MCG/ACTUATION INHALER    Inhale 2 puffs into the lungs every 6 (six) hours as needed (cough).    COD LIVER OIL ORAL    Take 1 capsule by mouth once daily.    DOCOSAHEXANOIC ACID/EPA (FISH OIL ORAL)    Take 1 capsule by mouth once daily.    INDOMETHACIN (INDOCIN) 25 MG CAPSULE    Take 1 30-60 minutes prior to activity prn    NAPROXEN SODIUM (ANAPROX) 220 MG TABLET    Take 220 mg by mouth as needed.     NYSTATIN-TRIAMCINOLONE (MYCOLOG II) CREAM    Apply topically 2 (two) times daily.    PROPRANOLOL (INDERAL LA) 60 MG 24 HR CAPSULE    Take 1 capsule (60 mg total) by mouth once daily.   Discontinued Medications    PREDNISONE (DELTASONE) 20 MG TABLET    Take 2 tablets with food for 3 days; then take one tablet with food for 2 days; then take 1/2 tablet with food for 2 days.     Review of patient's allergies indicates:   Allergen Reactions    Penicillins      Other reaction(s): Anaphylaxis       Physical Exam:   Body mass index is 39.09 kg/m².    Physical Exam      Detailed MSK exam:   Ortho/SPM Exam       Imaging:  CTA Head  Narrative:  EXAMINATION:  CTA HEAD    CLINICAL HISTORY:  Headache, sudden, severe;Headache associated with sexual activity    TECHNIQUE:  CT angiogram was performed from the level of the bottom of C2 to the top of the head following the IV administration of 100 mL of Omnipaque 350.   Sagittal and coronal reconstructions and maximum intensity projection reconstructions were performed.    COMPARISON:  None    FINDINGS:  Anterior circulation: No high-grade stenosis, occlusion, aneurysm, or vascular malformation demonstrated.    Posterior circulation: No high-grade stenosis, occlusion, aneurysm, or vascular malformation demonstrated.  Posterior communicating arteries are not visualized and may be congenitally absent or small.    Limited evaluation of the brain parenchyma demonstrates no gross abnormality.  No hydrocephalus.  No fluid collections visualized.    Paranasal sinuses and mastoid air cells are clear bilaterally.  No acute osseous findings.  Impression: Unremarkable CTA head.    Electronically signed by: Jersey Rudolph MD  Date:    03/23/2022  Time:    09:06    ***    Relevant imaging results were reviewed and interpreted by me and per my read: ***    This was discussed with the patient and / or family today.       There are no Patient Instructions on file for this visit.        A copy of today's visit note has been sent to the referring provider.       Aniket Pickett MD  Primary Care Sports Medicine        Disclaimer: This note was prepared using a voice recognition system and is likely to have sound alike errors within the text.     I spent a total of *** minutes on the day of the visit.This includes face to face time and non-face to face time preparing to see the patient (eg, review of tests), obtaining and/or reviewing separately obtained history, documenting clinical information in the electronic or other health record, independently interpreting results and communicating results to the patient/family/caregiver, or  care coordinator.

## 2022-11-14 ENCOUNTER — OFFICE VISIT (OUTPATIENT)
Dept: SPORTS MEDICINE | Facility: CLINIC | Age: 47
End: 2022-11-14

## 2022-11-14 VITALS — HEIGHT: 73 IN | WEIGHT: 296 LBS | BODY MASS INDEX: 39.23 KG/M2

## 2022-11-14 DIAGNOSIS — M76.821 POSTERIOR TIBIAL TENDINITIS OF RIGHT LEG: Primary | ICD-10-CM

## 2022-11-14 DIAGNOSIS — M76.71 PERONEAL TENDINITIS OF RIGHT LOWER LEG: ICD-10-CM

## 2022-11-14 DIAGNOSIS — S93.491D SPRAIN OF ANTERIOR TALOFIBULAR LIGAMENT OF RIGHT ANKLE, SUBSEQUENT ENCOUNTER: ICD-10-CM

## 2022-11-14 PROCEDURE — 99999 PR PBB SHADOW E&M-EST. PATIENT-LVL III: ICD-10-PCS | Mod: PBBFAC,,, | Performed by: STUDENT IN AN ORGANIZED HEALTH CARE EDUCATION/TRAINING PROGRAM

## 2022-11-14 PROCEDURE — 99999 PR PBB SHADOW E&M-EST. PATIENT-LVL III: CPT | Mod: PBBFAC,,, | Performed by: STUDENT IN AN ORGANIZED HEALTH CARE EDUCATION/TRAINING PROGRAM

## 2022-11-14 PROCEDURE — 99213 PR OFFICE/OUTPT VISIT, EST, LEVL III, 20-29 MIN: ICD-10-PCS | Mod: S$PBB,,, | Performed by: STUDENT IN AN ORGANIZED HEALTH CARE EDUCATION/TRAINING PROGRAM

## 2022-11-14 PROCEDURE — 99213 OFFICE O/P EST LOW 20 MIN: CPT | Mod: PBBFAC,PN | Performed by: STUDENT IN AN ORGANIZED HEALTH CARE EDUCATION/TRAINING PROGRAM

## 2022-11-14 PROCEDURE — 99213 OFFICE O/P EST LOW 20 MIN: CPT | Mod: S$PBB,,, | Performed by: STUDENT IN AN ORGANIZED HEALTH CARE EDUCATION/TRAINING PROGRAM

## 2022-11-14 RX ORDER — DICLOFENAC SODIUM 75 MG/1
75 TABLET, DELAYED RELEASE ORAL 2 TIMES DAILY
Qty: 60 TABLET | Refills: 2 | Status: SHIPPED | OUTPATIENT
Start: 2022-11-14 | End: 2024-03-25

## 2022-11-14 NOTE — PROGRESS NOTES
Patient ID: Pradeep Aranda  YOB: 1975  MRN: [id number]    Chief Complaint: Pain of the Right Ankle      Referred By: Self    Occupation: Security Work /       History of Present Illness: Pradeep Aranda is a right-hand dominant 47 y.o. male who presents today with 5/10 pain c/o right ankle injury.     To review, he was walking down some steps and rolled his right ankle with an inversion mechanism on 9/11/22. He had severe swelling and pain immediately. He went to ED in [place], [place] where they did not see a fracture. Patient wore a boot for a week and a half after.     He was initially seen in our office on 9/30/22.  Diagnosed with grade II sprain of ATFL and CFL.  Treated with home exercise program while he was travelling for work.  Voltaren 75mg BID.  Symptoms improved initially but his ankle has begun to worsen since early November.  He now has pain in the posteromedial and posterolateral ankle.  He is concerned because he is about to start working a project that involves climbing lots of stairs.      Past Medical History:   Past Medical History:   Diagnosis Date    Back pain     DDD (degenerative disc disease), lumbar     Hoffa's fat pad disease     Kidney stone on left side      Past Surgical History:   Procedure Laterality Date    COLONOSCOPY N/A 1/26/2016    Procedure: COLONOSCOPY;  Surgeon: Chance Ríos MD;  Location: [facility];  Service: Endoscopy;  Laterality: N/A;    KNEE ARTHROSCOPY Bilateral 05/19/2016    none      REFRACTIVE SURGERY Bilateral 2006     Family History   Problem Relation Age of Onset    Diverticulitis Mother     Cataracts Father     Cancer Sister     Diabetes Mellitus Unknown     Hypertension Unknown     Allergies Unknown     Stroke Unknown     Cataracts Maternal Grandmother     Glaucoma Maternal Grandmother      Social History     Socioeconomic History    Marital status: Single   Tobacco Use    Smoking status: Never    Smokeless  tobacco: Never   Substance and Sexual Activity    Alcohol use: Yes     Comment: Rare    Drug use: No    Sexual activity: Yes     Partners: Female   Social History Narrative    2 dogs and 7 cats; No smokers in the household.     Social Determinants of Health     Physical Activity: Insufficiently Active    Days of Exercise per Week: 3 days    Minutes of Exercise per Session: 40 min   Stress: No Stress Concern Present    Feeling of Stress : Not at all   Social Connections: Unknown    Frequency of Communication with Friends and Family: More than three times a week    Frequency of Social Gatherings with Friends and Family: More than three times a week    Active Member of Clubs or Organizations: No    Attends Club or Organization Meetings: Never    Marital Status:    Housing Stability: Unknown    Unable to Pay for Housing in the Last Year: Patient refused    Unstable Housing in the Last Year: Patient refused     Medication List with Changes/Refills   Current Medications    COD LIVER OIL ORAL    Take 1 capsule by mouth once daily.    DOCOSAHEXANOIC ACID/EPA (FISH OIL ORAL)    Take 1 capsule by mouth once daily.    NAPROXEN SODIUM (ANAPROX) 220 MG TABLET    Take 220 mg by mouth as needed.     PROPRANOLOL (INDERAL LA) 60 MG 24 HR CAPSULE    Take 1 capsule (60 mg total) by mouth once daily.   Changed and/or Refilled Medications    Modified Medication Previous Medication    DICLOFENAC (VOLTAREN) 75 MG EC TABLET diclofenac (VOLTAREN) 75 MG EC tablet       Take 1 tablet (75 mg total) by mouth 2 (two) times daily.    Take 1 tablet (75 mg total) by mouth 2 (two) times daily.     Review of patient's allergies indicates:   Allergen Reactions    Penicillins      Other reaction(s): Anaphylaxis       Physical Exam:   Body mass index is 39.05 kg/m².    Physical Exam  Vitals reviewed.   Constitutional:       Appearance: Normal appearance.   HENT:      Head: Normocephalic and atraumatic.      Nose: Nose normal.   Eyes:       Extraocular Movements: Extraocular movements intact.      Conjunctiva/sclera: Conjunctivae normal.   Pulmonary:      Effort: Pulmonary effort is normal.   Skin:     General: Skin is warm and dry.   Neurological:      General: No focal deficit present.      Mental Status: He is alert and oriented to person, place, and time.   Psychiatric:         Mood and Affect: Mood normal.     Detailed MSK exam:     Right Ankle:  Inspection: No effusion, erythema, or ecchymosis   Palpation tenderness: Tibialis posterior and peroneals at level of malleoli  Range of motion:  Full ROM equal bilaterally  Strength:  5/5 DF    5/5 PF    5/5 Inversion    5/5 Eversion  Special Tests: Negative Talar Tilt    Grade 1 Anterior Drawer  N/V Exam:  Tibial:    Normal sensory (plantar foot)  Normal motor (FHL)    Sup Peroneal:  Normal sensory (dorsal foot)  Normal motor (Peroneals)            Deep Peroneal:  Normal sensory (1st web space) Normal motor (EHL)    Sural:   Normal sensory (lateral foot)   Saphenous:   Normal sensory (medial lower leg)   Normal pedal pulses, warm and well perfused with capillary refill < 2 sec   Calf soft and compressible    Patient Instructions   Assessment:  Pradeep Aranda is a 47 y.o. male with a right ankle sprain following an inversion ankle injury, now about 3 weeks out.    Encounter Diagnoses   Name Primary?    Posterior tibial tendinitis of right leg Yes    Peroneal tendinitis of right lower leg     Sprain of anterior talofibular ligament of right ankle, subsequent encounter         Plan:  Clinical exam and history consistent with incompletely rehabilitated ankle sprain with new onset of tibialis posterior and peroneal tendon tendonitis/syndrome. ROM slightly decreased, although full ROM at this time. Still with ankle swelling/effusion. No pain at this time. Could consider MRI, primarily to evaluate for possible OCD, as I do not suspect fracture, or other large/significant tendon or ligamentous  pathology  Will refill Voltaren Rx from prior visit  Physical Therapy order provided today for him to take to [place] where he will be for the next several months at work, and I would highly recommend formal PT for this, as HEP as been ineffective at this time.  Defer MRI at this time, per patient preference    Follow-up: Can follow up as needed, depending on patient's recovery/schedule or sooner if there are any problems between now and then.    Thank you for choosing [facility] and Dr. Aniket Pickett for your orthopedic & sports medicine care. It is our goal to provide you with exceptional care that will help keep you healthy, active, and get you back in the game.    Please do not hesitate to reach out to us via email, phone, or MyChart with any questions, concerns, or feedback.    If you are experiencing pain/discomfort ,or have questions after 5pm and would like to be connected to the [facility] on-call team, please call this number and specify which Sports Medicine provider is treating you: [phone]               A copy of today's visit note has been sent to the referring provider.       Aniket Pickett MD  Primary Care Sports Medicine        Disclaimer: This note was prepared using a voice recognition system and is likely to have sound alike errors within the text.

## 2022-11-14 NOTE — PATIENT INSTRUCTIONS
Assessment:  Pradeep Aranda is a 47 y.o. male with a right ankle sprain following an inversion ankle injury, now about 3 weeks out.    Encounter Diagnoses   Name Primary?    Posterior tibial tendinitis of right leg Yes    Peroneal tendinitis of right lower leg     Sprain of anterior talofibular ligament of right ankle, subsequent encounter         Plan:  Clinical exam and history consistent with incompletely rehabilitated ankle sprain with new onset of tibialis posterior and peroneal tendon tendonitis/syndrome. ROM slightly decreased, although full ROM at this time. Still with ankle swelling/effusion. No pain at this time. Could consider MRI, primarily to evaluate for possible OCD, as I do not suspect fracture, or other large/significant tendon or ligamentous pathology  Will refill Voltaren Rx from prior visit  Physical Therapy order provided today for him to take to [place] where he will be for the next several months at work, and I would highly recommend formal PT for this, as HEP as been ineffective at this time.  Defer MRI at this time, per patient preference    Follow-up: Can follow up as needed, depending on patient's recovery/schedule or sooner if there are any problems between now and then.    Thank you for choosing [facility] and Dr. Aniket Pickett for your orthopedic & sports medicine care. It is our goal to provide you with exceptional care that will help keep you healthy, active, and get you back in the game.    Please do not hesitate to reach out to us via email, phone, or MyChart with any questions, concerns, or feedback.    If you are experiencing pain/discomfort ,or have questions after 5pm and would like to be connected to the [facility] on-call team, please call this number and specify which Sports Medicine provider is treating you: [phone]

## 2023-04-27 NOTE — TELEPHONE ENCOUNTER
Care Due:                  Date            Visit Type   Department     Provider  --------------------------------------------------------------------------------                                MYCHART                              FOLLOWUP/OF  [facility] INTERNAL  Last Visit: 03-      [facility] VISIT   MEDICINE       Jung Munoz  Next Visit: None Scheduled  None         None Found                                                            Last  Test          Frequency    Reason                     Performed    Due Date  --------------------------------------------------------------------------------    Office Visit  12 months..  propranoloL..............  03- 03-    [facility] Embedded Care Due Messages. Reference number: [id number].   4/27/2023 4:46:23 PM CDT

## 2023-04-27 NOTE — TELEPHONE ENCOUNTER
----- Message from Marli Stack sent at 4/27/2023  3:33 PM CDT -----  Contact: jaylen Cagle is calling in regards to getting an refill on propranoloL (INDERAL LA) 60 MG 24 hr capsule.Please call back at [phone]        [facility] in [address] [address]  [phone]        Thanks  OLIMPIA

## 2023-04-28 ENCOUNTER — TELEPHONE (OUTPATIENT)
Dept: INTERNAL MEDICINE | Facility: CLINIC | Age: 48
End: 2023-04-28

## 2023-04-28 RX ORDER — PROPRANOLOL HYDROCHLORIDE 60 MG/1
60 CAPSULE, EXTENDED RELEASE ORAL DAILY
Qty: 30 CAPSULE | Refills: 5 | Status: SHIPPED | OUTPATIENT
Start: 2023-04-28 | End: 2023-12-01

## 2023-07-14 ENCOUNTER — TELEPHONE (OUTPATIENT)
Dept: INTERNAL MEDICINE | Facility: CLINIC | Age: 48
End: 2023-07-14

## 2023-07-14 DIAGNOSIS — Z00.00 WELL ADULT EXAM: Primary | ICD-10-CM

## 2023-07-14 NOTE — TELEPHONE ENCOUNTER
----- Message from Luciana Johnston sent at 7/14/2023 11:05 AM CDT -----  Pt would like an order put in for blood work before traveling. Call back number is .[phone]. Thx. EL

## 2023-07-14 NOTE — TELEPHONE ENCOUNTER
Returned call to pt regarding lab orders. He states he will be in town next week and would like to have lab orders placed and wants to know if he will need to be seen for a f/u after. I advised him that I will send the message to Dr. Munoz and return call with response. He verbalized understanding.

## 2023-07-18 NOTE — TELEPHONE ENCOUNTER
Returned call to pt and advised him that lab orders were placed. Appt scheduled. Pt verbalized understanding of appt date and time.

## 2023-07-20 ENCOUNTER — LAB VISIT (OUTPATIENT)
Dept: LAB | Facility: HOSPITAL | Age: 48
End: 2023-07-20
Attending: PEDIATRICS

## 2023-07-20 DIAGNOSIS — Z00.00 WELL ADULT EXAM: ICD-10-CM

## 2023-07-20 LAB
ALBUMIN SERPL BCP-MCNC: 4.3 G/DL (ref 3.5–5.2)
ALP SERPL-CCNC: 68 U/L (ref 55–135)
ALT SERPL W/O P-5'-P-CCNC: 30 U/L (ref 10–44)
ANION GAP SERPL CALC-SCNC: 12 MMOL/L (ref 8–16)
AST SERPL-CCNC: 22 U/L (ref 10–40)
BILIRUB SERPL-MCNC: 0.5 MG/DL (ref 0.1–1)
BUN SERPL-MCNC: 17 MG/DL (ref 6–20)
CALCIUM SERPL-MCNC: 9.5 MG/DL (ref 8.7–10.5)
CHLORIDE SERPL-SCNC: 106 MMOL/L (ref 95–110)
CHOLEST SERPL-MCNC: 216 MG/DL (ref 120–199)
CHOLEST/HDLC SERPL: 5 {RATIO} (ref 2–5)
CO2 SERPL-SCNC: 24 MMOL/L (ref 23–29)
CREAT SERPL-MCNC: 1.2 MG/DL (ref 0.5–1.4)
EST. GFR  (NO RACE VARIABLE): >60 ML/MIN/1.73 M^2
ESTIMATED AVG GLUCOSE: 103 MG/DL (ref 68–131)
GLUCOSE SERPL-MCNC: 96 MG/DL (ref 70–110)
HBA1C MFR BLD: 5.2 % (ref 4–5.6)
HDLC SERPL-MCNC: 43 MG/DL (ref 40–75)
HDLC SERPL: 19.9 % (ref 20–50)
LDLC SERPL CALC-MCNC: 146.4 MG/DL (ref 63–159)
NONHDLC SERPL-MCNC: 173 MG/DL
POTASSIUM SERPL-SCNC: 4 MMOL/L (ref 3.5–5.1)
PROT SERPL-MCNC: 7.4 G/DL (ref 6–8.4)
SODIUM SERPL-SCNC: 142 MMOL/L (ref 136–145)
TRIGL SERPL-MCNC: 133 MG/DL (ref 30–150)

## 2023-07-20 PROCEDURE — 80061 LIPID PANEL: CPT | Performed by: PEDIATRICS

## 2023-07-20 PROCEDURE — 80053 COMPREHEN METABOLIC PANEL: CPT | Performed by: PEDIATRICS

## 2023-07-20 PROCEDURE — 36415 COLL VENOUS BLD VENIPUNCTURE: CPT | Performed by: PEDIATRICS

## 2023-07-20 PROCEDURE — 83036 HEMOGLOBIN GLYCOSYLATED A1C: CPT | Performed by: PEDIATRICS

## 2023-08-01 ENCOUNTER — OFFICE VISIT (OUTPATIENT)
Dept: INTERNAL MEDICINE | Facility: CLINIC | Age: 48
End: 2023-08-01

## 2023-08-01 VITALS
OXYGEN SATURATION: 98 % | DIASTOLIC BLOOD PRESSURE: 88 MMHG | WEIGHT: 299.19 LBS | HEIGHT: 73 IN | SYSTOLIC BLOOD PRESSURE: 138 MMHG | HEART RATE: 69 BPM | TEMPERATURE: 98 F | BODY MASS INDEX: 39.65 KG/M2 | RESPIRATION RATE: 17 BRPM

## 2023-08-01 DIAGNOSIS — G43.009 MIGRAINE WITHOUT AURA AND WITHOUT STATUS MIGRAINOSUS, NOT INTRACTABLE: ICD-10-CM

## 2023-08-01 DIAGNOSIS — G47.33 OSA ON CPAP: ICD-10-CM

## 2023-08-01 DIAGNOSIS — N20.0 CALCIUM OXALATE RENAL STONES: ICD-10-CM

## 2023-08-01 DIAGNOSIS — Z00.00 WELL ADULT EXAM: Primary | ICD-10-CM

## 2023-08-01 DIAGNOSIS — E66.01 CLASS 2 SEVERE OBESITY DUE TO EXCESS CALORIES WITH SERIOUS COMORBIDITY AND BODY MASS INDEX (BMI) OF 39.0 TO 39.9 IN ADULT: ICD-10-CM

## 2023-08-01 DIAGNOSIS — Z12.11 COLON CANCER SCREENING: ICD-10-CM

## 2023-08-01 PROBLEM — E66.812 CLASS 2 SEVERE OBESITY DUE TO EXCESS CALORIES WITH SERIOUS COMORBIDITY AND BODY MASS INDEX (BMI) OF 39.0 TO 39.9 IN ADULT: Status: ACTIVE | Noted: 2023-08-01

## 2023-08-01 PROCEDURE — 99215 OFFICE O/P EST HI 40 MIN: CPT | Mod: PBBFAC | Performed by: PEDIATRICS

## 2023-08-01 PROCEDURE — 99396 PREV VISIT EST AGE 40-64: CPT | Mod: S$PBB,,, | Performed by: PEDIATRICS

## 2023-08-01 PROCEDURE — 99396 PR PREVENTIVE VISIT,EST,40-64: ICD-10-PCS | Mod: S$PBB,,, | Performed by: PEDIATRICS

## 2023-08-01 PROCEDURE — 99999 PR PBB SHADOW E&M-EST. PATIENT-LVL V: CPT | Mod: PBBFAC,,, | Performed by: PEDIATRICS

## 2023-08-01 PROCEDURE — 99999 PR PBB SHADOW E&M-EST. PATIENT-LVL V: ICD-10-PCS | Mod: PBBFAC,,, | Performed by: PEDIATRICS

## 2023-08-01 NOTE — PROGRESS NOTES
Subjective     Patient ID: Pradeep Aranda is a 48 y.o. male.    Chief Complaint: Follow-up    Pradeep Aranda is a 48 y.o. male who presents to the clinic for annual. Pt reports he is moving to [place] with his wife next week and will seek care there for emergencies, he states he will be traveling back and forth to here. In addition, he states last week he took a sleep apnea test, is on CPAP machine. Diagnosed with SUSANA.     1)Calcium oxalate renal stones: no more stones, drinking plenty of water  2)HA: post coital/migrainous propanolol has markedly helped       PMHx, PSHx, SocHx, and FHx reviewed and discussed with patient.      LABS REVIEWED: A1C, CMP, and lipid panel               Review of Systems   Constitutional:  Negative for chills, diaphoresis, fatigue and fever.   HENT:  Negative for sore throat and trouble swallowing.    Respiratory:  Negative for cough and shortness of breath.    Cardiovascular:  Negative for chest pain.   Gastrointestinal:  Negative for abdominal pain, anal bleeding, constipation, diarrhea, nausea and vomiting.   Endocrine: Negative for cold intolerance, heat intolerance, polydipsia, polyphagia and polyuria.   Neurological:  Negative for headaches.   All other systems reviewed and are negative.         Objective     Physical Exam  Constitutional:       General: He is not in acute distress.     Appearance: Normal appearance. He is obese. He is not ill-appearing or toxic-appearing.   Cardiovascular:      Rate and Rhythm: Normal rate and regular rhythm.      Pulses: Normal pulses.      Heart sounds: Normal heart sounds. No murmur heard.     No friction rub. No gallop.   Pulmonary:      Effort: Pulmonary effort is normal.      Breath sounds: Normal breath sounds.   Abdominal:      General: There is no distension.      Palpations: There is no mass.      Tenderness: There is no abdominal tenderness. There is no guarding or rebound.      Hernia: No hernia is present.    Neurological:      Mental Status: He is alert and oriented to person, place, and time.   Psychiatric:         Mood and Affect: Mood normal.         Behavior: Behavior normal.         Thought Content: Thought content normal.         Judgment: Judgment normal.            Assessment and Plan     1. Well adult exam  -     Lipid Panel; Future; Expected date: 08/01/2023  -     Comprehensive Metabolic Panel; Future; Expected date: 08/01/2023    2. Calcium oxalate renal stones    3. SUSANA on CPAP    4. Migraine without aura and without status migrainosus, not intractable    5. Colon cancer screening  -     Ambulatory referral/consult to Endo Procedure ; Future; Expected date: 08/02/2023    6. Class 2 severe obesity due to excess calories with serious comorbidity and body mass index (BMI) of 39.0 to 39.9 in adult        Referral to endoscopy for colon cancer screening. D&E, weight moderation for lipids. Maintain propanolol. HMI discussed with pt. F/U yearly with labs if he is here in [place].            Follow up in about 1 year (around 8/1/2024).

## 2023-12-01 RX ORDER — PROPRANOLOL HYDROCHLORIDE 60 MG/1
60 CAPSULE, EXTENDED RELEASE ORAL
Qty: 90 CAPSULE | Refills: 2 | Status: SHIPPED | OUTPATIENT
Start: 2023-12-01

## 2023-12-01 NOTE — TELEPHONE ENCOUNTER
Refill Decision Note   Pradeep PradoAranda  is requesting a refill authorization.  Brief Assessment and Rationale for Refill:  Approve     Medication Therapy Plan:  Rx sent to requested pharmacy since it is within the same chain.    Medication Reconciliation Completed: No   Comments:     No Care Gaps recommended.     Note composed:4:34 PM 12/01/2023

## 2023-12-01 NOTE — TELEPHONE ENCOUNTER
No care due was identified.  [facility] Embedded Care Due Messages. Reference number: [id number].   12/01/2023 9:20:41 AM CST

## 2024-03-25 ENCOUNTER — OFFICE VISIT (OUTPATIENT)
Dept: INTERNAL MEDICINE | Facility: CLINIC | Age: 49
End: 2024-03-25

## 2024-03-25 VITALS
SYSTOLIC BLOOD PRESSURE: 116 MMHG | TEMPERATURE: 98 F | WEIGHT: 303.56 LBS | BODY MASS INDEX: 40.23 KG/M2 | HEIGHT: 73 IN | DIASTOLIC BLOOD PRESSURE: 80 MMHG | OXYGEN SATURATION: 97 % | HEART RATE: 64 BPM

## 2024-03-25 DIAGNOSIS — R09.89 CHEST CONGESTION: ICD-10-CM

## 2024-03-25 DIAGNOSIS — D75.1 POLYCYTHEMIA: Primary | ICD-10-CM

## 2024-03-25 PROCEDURE — 99999 PR PBB SHADOW E&M-EST. PATIENT-LVL III: CPT | Mod: PBBFAC,,, | Performed by: FAMILY MEDICINE

## 2024-03-25 PROCEDURE — 99214 OFFICE O/P EST MOD 30 MIN: CPT | Mod: S$PBB,,, | Performed by: FAMILY MEDICINE

## 2024-03-25 PROCEDURE — 99213 OFFICE O/P EST LOW 20 MIN: CPT | Mod: PBBFAC | Performed by: FAMILY MEDICINE

## 2024-03-25 RX ORDER — GABAPENTIN 300 MG/1
300 CAPSULE ORAL 3 TIMES DAILY
COMMUNITY
Start: 2024-01-19

## 2024-03-25 RX ORDER — PROMETHAZINE HYDROCHLORIDE AND DEXTROMETHORPHAN HYDROBROMIDE 6.25; 15 MG/5ML; MG/5ML
5 SYRUP ORAL EVERY 4 HOURS PRN
Qty: 180 ML | Refills: 0 | Status: SHIPPED | OUTPATIENT
Start: 2024-03-25 | End: 2024-04-04

## 2024-03-25 RX ORDER — AZITHROMYCIN 250 MG/1
TABLET, FILM COATED ORAL
Qty: 6 TABLET | Refills: 0 | Status: SHIPPED | OUTPATIENT
Start: 2024-03-25 | End: 2024-03-30

## 2024-03-25 RX ORDER — TRAZODONE HYDROCHLORIDE 50 MG/1
TABLET ORAL
COMMUNITY
Start: 2024-03-01

## 2024-03-25 NOTE — PROGRESS NOTES
Pradeep Aranda  03/25/2024  [id number]    Jung Munoz MD  Patient Care Team:  Jung Munoz MD as PCP - General (Pediatrics)          Visit Type:Urgent visit    Chief Complaint:  Chief Complaint   Patient presents with    Chest Congestion    Cough       History of Present Illness:    Urgent visit  PCP Tammy    Cough, hoarseness   No fever  He said for one week  Feels that its thick mucus  He has only tried zyrtec  He said no SOB  He might hear an occasional wheeze.    He had bells palsy, shingles after viral illness  He was valtrex- prednisone    He has had some polycythemia  Did Myeloprolifeative Neoplasm negative  Jak2 is negative  EPO is negative  My suggestion is to get another CBC done.  Need to see where the levels are and if he needs Heme consult, for phlebotomy      History:  Past Medical History:   Diagnosis Date    Back pain     DDD (degenerative disc disease), lumbar     Hoffa's fat pad disease     Kidney stone on left side      Past Surgical History:   Procedure Laterality Date    COLONOSCOPY N/A 1/26/2016    Procedure: COLONOSCOPY;  Surgeon: Chance Ríos MD;  Location: [facility];  Service: Endoscopy;  Laterality: N/A;    KNEE ARTHROSCOPY Bilateral 05/19/2016    none      REFRACTIVE SURGERY Bilateral 2006     Family History   Problem Relation Age of Onset    Diverticulitis Mother     Cataracts Father     Cancer Sister     Diabetes Mellitus Unknown     Hypertension Unknown     Allergies Unknown     Stroke Unknown     Cataracts Maternal Grandmother     Glaucoma Maternal Grandmother      Social History     Socioeconomic History    Marital status: Single   Tobacco Use    Smoking status: Never    Smokeless tobacco: Never   Substance and Sexual Activity    Alcohol use: Yes     Comment: Rare    Drug use: No    Sexual activity: Yes     Partners: Female   Social History Narrative    2 dogs and 7 cats; No smokers in the household.     Social Determinants of Health     Food Insecurity:  No Food Insecurity (8/15/2019)    Hunger Vital Sign     Worried About Running Out of Food in the Last Year: Never true     Ran Out of Food in the Last Year: Never true   Transportation Needs: No Transportation Needs (8/15/2019)    PRAPARE - Transportation     Lack of Transportation (Medical): No     Lack of Transportation (Non-Medical): No   Physical Activity: Insufficiently Active (3/23/2022)    Exercise Vital Sign     Days of Exercise per Week: 3 days     Minutes of Exercise per Session: 40 min   Stress: No Stress Concern Present (3/23/2022)    [employer] [place] [employer] - Occupational Stress Questionnaire     Feeling of Stress : Not at all   Social Connections: Unknown (3/23/2022)    Social Connection and Isolation Panel [NHANES]     Frequency of Communication with Friends and Family: More than three times a week     Frequency of Social Gatherings with Friends and Family: More than three times a week     Active Member of Clubs or Organizations: No     Attends Club or Organization Meetings: Never     Marital Status:    Housing Stability: Unknown (3/23/2022)    Housing Stability Vital Sign     Unable to Pay for Housing in the Last Year: Patient refused     Unstable Housing in the Last Year: Patient refused     Patient Active Problem List   Diagnosis    DDD (degenerative disc disease), lumbar    Calcium oxalate renal stones    Inflammation of sacroiliac joint    Insomnia    DJD (degenerative joint disease), lumbar    Rotator cuff impingement syndrome of right shoulder    Arm weakness-rotator cuff weakness    Hoffa's fat pad disease    Meniscal cyst    Chondromalacia of both patellae    Patellar tendonitis of both knees    Renal stones    Patellar tracking disorder of right knee    Pterygium of eye    Toenail fungus    Ureteral calculus    SUSANA on CPAP    Migraine without aura and without status migrainosus, not intractable    Class 2 severe obesity due to excess calories with serious comorbidity  and body mass index (BMI) of 39.0 to 39.9 in adult     Review of patient's allergies indicates:   Allergen Reactions    Penicillins      Other reaction(s): Anaphylaxis       The following were reviewed at this visit: active problem list, medication list, allergies, family history, social history, and health maintenance.    Medications:  Current Outpatient Medications on File Prior to Visit   Medication Sig Dispense Refill    gabapentin (NEURONTIN) 300 MG capsule Take 300 mg by mouth 3 (three) times daily.      naproxen sodium (ANAPROX) 220 MG tablet Take 220 mg by mouth as needed.       propranoloL (INDERAL LA) 60 MG 24 hr capsule TAKE 1 CAPSULE(60 MG) BY MOUTH EVERY DAY 90 capsule 2    traZODone (DESYREL) 50 MG tablet PLEASE SEE ATTACHED FOR DETAILED DIRECTIONS      COD LIVER OIL ORAL Take 1 capsule by mouth once daily.      docosahexanoic acid/epa (FISH OIL ORAL) Take 1 capsule by mouth once daily.      [DISCONTINUED] diclofenac (VOLTAREN) 75 MG EC tablet Take 1 tablet (75 mg total) by mouth 2 (two) times daily. (Patient not taking: Reported on 3/25/2024) 60 tablet 2     No current facility-administered medications on file prior to visit.       Medications have been reviewed and reconciled with patient at this visit.  Barriers to medications reviewed with patient.    Adverse reactions to current medications reviewed with patient..    Over the counter medications reviewed and reconciled with patient.    Exam:  Wt Readings from Last 3 Encounters:   03/25/24 (!) 137.7 kg (303 lb 9.2 oz)   08/01/23 135.7 kg (299 lb 2.6 oz)   11/14/22 134.3 kg (296 lb)     Temp Readings from Last 3 Encounters:   03/25/24 97.5 °F (36.4 °C) (Tympanic)   08/01/23 97.6 °F (36.4 °C) (Tympanic)   03/23/22 97.4 °F (36.3 °C) (Tympanic)     BP Readings from Last 3 Encounters:   03/25/24 116/80   08/01/23 138/88   03/23/22 (!) 140/98     Pulse Readings from Last 3 Encounters:   03/25/24 64   08/01/23 69   03/23/22 62     Body mass index is 40.05  kg/m².      Review of Systems   Constitutional: Negative.  Negative for chills and fever.   HENT:  Positive for congestion and sore throat. Negative for sinus pain.    Eyes:  Negative for blurred vision and double vision.   Respiratory:  Positive for cough and sputum production.    Cardiovascular:  Negative for chest pain, palpitations and leg swelling.   Gastrointestinal:  Negative for abdominal pain, constipation, diarrhea, heartburn, nausea and vomiting.   Genitourinary: Negative.    Musculoskeletal: Negative.    Skin: Negative.  Negative for rash.   Neurological: Negative.    Endo/Heme/Allergies: Negative.  Negative for polydipsia. Does not bruise/bleed easily.   Psychiatric/Behavioral:  Negative for depression and substance abuse.      Physical Exam  Nursing note reviewed.   Pulmonary:      Effort: Pulmonary effort is normal. No respiratory distress.   Neurological:      Mental Status: He is alert and oriented to person, place, and time.   Psychiatric:         Mood and Affect: Mood normal.         Behavior: Behavior normal.         Thought Content: Thought content normal.         Judgment: Judgment normal.         Laboratory Reviewed ({Yes)  Lab Results   Component Value Date    WBC 5.29 11/15/2018    HGB 17.2 11/15/2018    HCT 51.8 11/15/2018     11/15/2018    CHOL 216 (H) 07/20/2023    TRIG 133 07/20/2023    HDL 43 07/20/2023    ALT 30 07/20/2023    AST 22 07/20/2023     07/20/2023    K 4.0 07/20/2023     07/20/2023    CREATININE 1.2 07/20/2023    BUN 17 07/20/2023    CO2 24 07/20/2023    TSH 3.649 11/15/2018    HGBA1C 5.2 07/20/2023       Pradeep was seen today for chest congestion and cough.    Diagnoses and all orders for this visit:    Polycythemia  -     CBC Auto Differential; Future    Chest congestion  -     azithromycin (Z-QUANG) 250 MG tablet; Take 2 tablets by mouth on day 1; Take 1 tablet by mouth on days 2-5  -     promethazine-dextromethorphan (PROMETHAZINE-DM) 6.25-15 mg/5 mL Syrp;  Take 5 mLs by mouth every 4 (four) hours as needed (cough).      He travels from [place] to [facility]  Does not currently have Health Insurance, self insured cash pay    I recommended recheck on CBC  He will need annual with Dr. Munoz in fall.    Did not give steriods  BP okally    Reviewed the labs [facility] with him.              Care Plan/Goals: Reviewed    Goals    None         Follow up: No follow-ups on file.    After visit summary was printed and given to patient upon discharge today.  Patient goals and care plan are included in After Visit Summary.

## 2024-10-24 ENCOUNTER — OFFICE VISIT (OUTPATIENT)
Dept: INTERNAL MEDICINE | Facility: CLINIC | Age: 49
End: 2024-10-24

## 2024-10-24 VITALS
HEART RATE: 62 BPM | TEMPERATURE: 97 F | DIASTOLIC BLOOD PRESSURE: 86 MMHG | SYSTOLIC BLOOD PRESSURE: 122 MMHG | HEIGHT: 73 IN | BODY MASS INDEX: 35.39 KG/M2 | WEIGHT: 267 LBS | OXYGEN SATURATION: 97 %

## 2024-10-24 DIAGNOSIS — J01.00 ACUTE MAXILLARY SINUSITIS, RECURRENCE NOT SPECIFIED: Primary | ICD-10-CM

## 2024-10-24 DIAGNOSIS — J40 BRONCHITIS: ICD-10-CM

## 2024-10-24 PROCEDURE — 99214 OFFICE O/P EST MOD 30 MIN: CPT | Mod: S$PBB,,, | Performed by: NURSE PRACTITIONER

## 2024-10-24 PROCEDURE — 96372 THER/PROPH/DIAG INJ SC/IM: CPT | Mod: PBBFAC

## 2024-10-24 PROCEDURE — 99213 OFFICE O/P EST LOW 20 MIN: CPT | Mod: PBBFAC,25 | Performed by: NURSE PRACTITIONER

## 2024-10-24 PROCEDURE — 99999PBSHW PR PBB SHADOW TECHNICAL ONLY FILED TO HB: Mod: PBBFAC,,,

## 2024-10-24 PROCEDURE — 99999 PR PBB SHADOW E&M-EST. PATIENT-LVL III: CPT | Mod: PBBFAC,,, | Performed by: NURSE PRACTITIONER

## 2024-10-24 RX ORDER — TRAZODONE HYDROCHLORIDE 50 MG/1
50 TABLET ORAL NIGHTLY
Qty: 90 TABLET | Refills: 3 | Status: SHIPPED | OUTPATIENT
Start: 2024-10-24

## 2024-10-24 RX ORDER — TRIAMCINOLONE ACETONIDE 40 MG/ML
60 INJECTION, SUSPENSION INTRA-ARTICULAR; INTRAMUSCULAR
Status: COMPLETED | OUTPATIENT
Start: 2024-10-24 | End: 2024-10-24

## 2024-10-24 RX ORDER — GABAPENTIN 300 MG/1
300 CAPSULE ORAL NIGHTLY
Qty: 90 CAPSULE | Refills: 3 | Status: SHIPPED | OUTPATIENT
Start: 2024-10-24

## 2024-10-24 RX ORDER — PROPRANOLOL HYDROCHLORIDE 60 MG/1
60 CAPSULE, EXTENDED RELEASE ORAL DAILY
Qty: 90 CAPSULE | Refills: 3 | Status: SHIPPED | OUTPATIENT
Start: 2024-10-24

## 2024-10-24 RX ORDER — DOXYCYCLINE 100 MG/1
100 CAPSULE ORAL EVERY 12 HOURS
Qty: 20 CAPSULE | Refills: 0 | Status: SHIPPED | OUTPATIENT
Start: 2024-10-24 | End: 2024-11-03

## 2024-10-24 RX ADMIN — TRIAMCINOLONE ACETONIDE 60 MG: 400 INJECTION, SUSPENSION INTRA-ARTICULAR; INTRAMUSCULAR at 11:10

## 2024-10-24 NOTE — PROGRESS NOTES
Subjective:       Patient ID: Pradeep Aranda is a 49 y.o. male.    Chief Complaint: Sore Throat (Over a week), Cough (Productive cough discolored phlegm), and Medication Refill (trazadone)    Sore Throat   Associated symptoms include congestion, coughing and ear pain. Pertinent negatives include no drooling, ear discharge, headaches, shortness of breath or trouble swallowing.   Cough  Associated symptoms include ear pain, a fever, postnasal drip, rhinorrhea and a sore throat. Pertinent negatives include no chest pain, chills, headaches, rash, shortness of breath or wheezing.   Medication Refill  Associated symptoms include congestion, coughing, a fever and a sore throat. Pertinent negatives include no chest pain, chills, diaphoresis, fatigue, headaches, numbness, rash or weakness.           Past Medical History:   Diagnosis Date    Back pain     DDD (degenerative disc disease), lumbar     Hoffa's fat pad disease     Kidney stone on left side      Past Surgical History:   Procedure Laterality Date    COLONOSCOPY N/A 1/26/2016    Procedure: COLONOSCOPY;  Surgeon: Chance Ríos MD;  Location: [facility];  Service: Endoscopy;  Laterality: N/A;    KNEE ARTHROSCOPY Bilateral 05/19/2016    none      REFRACTIVE SURGERY Bilateral 2006     Social History     Socioeconomic History    Marital status: Single   Tobacco Use    Smoking status: Never    Smokeless tobacco: Never   Substance and Sexual Activity    Alcohol use: Yes     Comment: Rare    Drug use: No    Sexual activity: Yes     Partners: Female   Social History Narrative    2 dogs and 7 cats; No smokers in the household.     Social Drivers of Health     Food Insecurity: No Food Insecurity (8/15/2019)    Hunger Vital Sign     Worried About Running Out of Food in the Last Year: Never true     Ran Out of Food in the Last Year: Never true   Transportation Needs: No Transportation Needs (8/15/2019)    PRAPARE - Transportation     Lack of Transportation (Medical):  No     Lack of Transportation (Non-Medical): No   Physical Activity: Insufficiently Active (3/23/2022)    Exercise Vital Sign     Days of Exercise per Week: 3 days     Minutes of Exercise per Session: 40 min   Stress: No Stress Concern Present (3/23/2022)    [employer] [place] [employer] - Occupational Stress Questionnaire     Feeling of Stress : Not at all   Housing Stability: Unknown (3/23/2022)    Housing Stability Vital Sign     Unable to Pay for Housing in the Last Year: Patient refused     Unstable Housing in the Last Year: Patient refused     Review of patient's allergies indicates:   Allergen Reactions    Penicillins      Other reaction(s): Anaphylaxis     Current Outpatient Medications   Medication Sig    COD LIVER OIL ORAL Take 1 capsule by mouth once daily.    docosahexanoic acid/epa (FISH OIL ORAL) Take 1 capsule by mouth once daily.    naproxen sodium (ANAPROX) 220 MG tablet Take 220 mg by mouth as needed.     doxycycline (MONODOX) 100 MG capsule Take 1 capsule (100 mg total) by mouth every 12 (twelve) hours. for 10 days    gabapentin (NEURONTIN) 300 MG capsule Take 1 capsule (300 mg total) by mouth every evening.    propranoloL (INDERAL LA) 60 MG 24 hr capsule Take 1 capsule (60 mg total) by mouth once daily.    traZODone (DESYREL) 50 MG tablet Take 1 tablet (50 mg total) by mouth every evening.     No current facility-administered medications for this visit.           Review of Systems   Constitutional:  Positive for fever. Negative for activity change, appetite change, chills, diaphoresis, fatigue and unexpected weight change.   HENT:  Positive for congestion, ear pain, postnasal drip, rhinorrhea, sinus pressure, sinus pain and sore throat. Negative for dental problem, drooling, ear discharge, facial swelling, hearing loss, mouth sores, nosebleeds, sneezing, tinnitus, trouble swallowing and voice change.    Respiratory:  Positive for cough. Negative for choking, shortness of breath and  wheezing.    Cardiovascular:  Negative for chest pain, palpitations and leg swelling.   Skin:  Negative for rash.   Neurological:  Negative for dizziness, seizures, syncope, facial asymmetry, speech difficulty, weakness, light-headedness, numbness and headaches.   Psychiatric/Behavioral:  Positive for sleep disturbance.        Objective:      Physical Exam  Constitutional:       General: He is not in acute distress.  HENT:      Right Ear: Tympanic membrane is erythematous.      Left Ear: Tympanic membrane is erythematous.      Nose: Mucosal edema and rhinorrhea present.      Mouth/Throat:      Mouth: No oral lesions.      Pharynx: Posterior oropharyngeal erythema present. No oropharyngeal exudate or uvula swelling.   Eyes:      Conjunctiva/sclera: Conjunctivae normal.      Pupils: Pupils are equal, round, and reactive to light.   Cardiovascular:      Heart sounds: No murmur heard.     No friction rub. No gallop.   Pulmonary:      Effort: No respiratory distress.      Breath sounds: No wheezing or rales.   Skin:     General: Skin is warm and dry.   Neurological:      Mental Status: He is alert and oriented to person, place, and time.         Assessment:     Vitals:    10/24/24 1112   BP: 122/86   Pulse: 62   Temp: 97 °F (36.1 °C)         1. Acute maxillary sinusitis, recurrence not specified    2. Bronchitis        Plan:   Acute maxillary sinusitis, recurrence not specified    Bronchitis    Other orders  -     doxycycline (MONODOX) 100 MG capsule; Take 1 capsule (100 mg total) by mouth every 12 (twelve) hours. for 10 days  Dispense: 20 capsule; Refill: 0  -     triamcinolone acetonide injection 60 mg  -     traZODone (DESYREL) 50 MG tablet; Take 1 tablet (50 mg total) by mouth every evening.  Dispense: 90 tablet; Refill: 3  -     propranoloL (INDERAL LA) 60 MG 24 hr capsule; Take 1 capsule (60 mg total) by mouth once daily.  Dispense: 90 capsule; Refill: 3  -     gabapentin (NEURONTIN) 300 MG capsule; Take 1 capsule  (300 mg total) by mouth every evening.  Dispense: 90 capsule; Refill: 3      As above  Refills as requested

## 2025-04-22 ENCOUNTER — LAB VISIT (OUTPATIENT)
Dept: LAB | Facility: HOSPITAL | Age: 50
End: 2025-04-22
Attending: NURSE PRACTITIONER

## 2025-04-22 ENCOUNTER — OFFICE VISIT (OUTPATIENT)
Dept: UROLOGY | Facility: CLINIC | Age: 50
End: 2025-04-22

## 2025-04-22 VITALS
SYSTOLIC BLOOD PRESSURE: 140 MMHG | BODY MASS INDEX: 36.71 KG/M2 | HEIGHT: 73 IN | HEART RATE: 75 BPM | RESPIRATION RATE: 14 BRPM | WEIGHT: 277 LBS | DIASTOLIC BLOOD PRESSURE: 106 MMHG

## 2025-04-22 DIAGNOSIS — N50.811 RIGHT TESTICULAR PAIN: Primary | ICD-10-CM

## 2025-04-22 DIAGNOSIS — Z12.5 PROSTATE CANCER SCREENING: ICD-10-CM

## 2025-04-22 LAB
BILIRUBIN, UA POC OHS: NEGATIVE
BLOOD, UA POC OHS: NEGATIVE
CLARITY, UA POC OHS: CLEAR
COLOR, UA POC OHS: YELLOW
GLUCOSE, UA POC OHS: NEGATIVE
KETONES, UA POC OHS: NEGATIVE
LEUKOCYTES, UA POC OHS: NEGATIVE
NITRITE, UA POC OHS: NEGATIVE
PH, UA POC OHS: 6
PROTEIN, UA POC OHS: NEGATIVE
PSA SERPL-MCNC: 0.51 NG/ML
SPECIFIC GRAVITY, UA POC OHS: 1.02
UROBILINOGEN, UA POC OHS: 0.2

## 2025-04-22 PROCEDURE — 99214 OFFICE O/P EST MOD 30 MIN: CPT | Mod: PBBFAC | Performed by: NURSE PRACTITIONER

## 2025-04-22 PROCEDURE — 99999 PR PBB SHADOW E&M-EST. PATIENT-LVL IV: CPT | Mod: PBBFAC,,, | Performed by: NURSE PRACTITIONER

## 2025-04-22 PROCEDURE — 99204 OFFICE O/P NEW MOD 45 MIN: CPT | Mod: S$PBB,,, | Performed by: NURSE PRACTITIONER

## 2025-04-22 PROCEDURE — 81003 URINALYSIS AUTO W/O SCOPE: CPT | Mod: PBBFAC | Performed by: NURSE PRACTITIONER

## 2025-04-22 PROCEDURE — 84153 ASSAY OF PSA TOTAL: CPT

## 2025-04-22 PROCEDURE — 99999PBSHW POCT URINALYSIS(INSTRUMENT): Mod: PBBFAC,,,

## 2025-04-22 PROCEDURE — 36415 COLL VENOUS BLD VENIPUNCTURE: CPT

## 2025-04-22 NOTE — PROGRESS NOTES
Chief Complaint:   Right testicular pain    HPI:   Patient is a 49-year-old male that is presenting to establish care.  States that he has had right testicular pain, on and off for over a years.  States that right testicular pain resolves with over-the-counter meds, scrotal support and position change.  Denies gross hematuria.  No past PSAs.  States that father had prostate cancer.  PVR is 28 mL in urine in clinic is negative.  Allergies:  Penicillins    Medications:  has a current medication list which includes the following prescription(s): cod liver oil, docosahexaenoic acid/epa, gabapentin, naproxen sodium, propranolol, and trazodone.    Review of Systems:  General: No fever, chills, fatigability, or weight loss.  Skin: No rashes, itching, or changes in color or texture of skin.  Chest: Denies APONTE, cyanosis, wheezing, cough, and sputum production.  Abdomen: Appetite fine. No weight loss. Denies diarrhea, abdominal pain, hematemesis, or blood in stool.  Musculoskeletal: No joint stiffness or swelling. Denies back pain.  : As above.  All other review of systems negative.    PMH:   has a past medical history of Back pain, DDD (degenerative disc disease), lumbar, Hoffa's fat pad disease, and Kidney stone on left side.    PSH:   has a past surgical history that includes none; Colonoscopy (N/A, 1/26/2016); Knee arthroscopy (Bilateral, 05/19/2016); and Refractive surgery (Bilateral, 2006).    FamHx: family history includes Allergies in his unknown relative; Cancer in his sister; Cataracts in his father and maternal grandmother; Diabetes Mellitus in his unknown relative; Diverticulitis in his mother; Glaucoma in his maternal grandmother; Hypertension in his unknown relative; Stroke in his unknown relative.    SocHx:  reports that he has never smoked. He has never used smokeless tobacco. He reports current alcohol use. He reports that he does not use drugs.      Physical Exam:  General: A&Ox3, no apparent distress, no  deformities  Neck: No masses, normal thyroid  Lungs: normal inspiration, no use of accessory muscles  Heart: normal pulse, no arrhythmias  Abdomen: Soft, NT, ND, no masses, no hernias, no hepatosplenomegaly  Lymphatic: Neck and groin nodes negative  Skin: The skin is warm and dry. No jaundice  Ext: No c/c/e.  : Test desc coni, no abnormalities of epididymus. Penis  with normal penile and scrotal skin. Meatus normal. Normal rectal tone, no hemorrhoids. Prost 30 gm no nodules or masses appreciated. SV not palpable. Perineum and anus normal.    Labs/Studies:   See HPI  Impression/Plan:   Family history of prostate cancer and prostate cancer screening  CYNTHIA was unremarkable and patient was sent for PSA labs.  Will be contacted with results and needed follow-up.    Chronic right testicular pain  Exam was unremarkable, will proceed with scrotal ultrasound.  Patient states he does not have medical insurance, might have to wait on scrotal ultrasound, will contact us using the patient portal [employer] functionality.

## 2025-04-23 ENCOUNTER — RESULTS FOLLOW-UP (OUTPATIENT)
Dept: UROLOGY | Facility: CLINIC | Age: 50
End: 2025-04-23

## 2025-04-23 ENCOUNTER — HOSPITAL ENCOUNTER (OUTPATIENT)
Dept: RADIOLOGY | Facility: HOSPITAL | Age: 50
Discharge: HOME | End: 2025-04-23
Attending: NURSE PRACTITIONER

## 2025-04-23 DIAGNOSIS — N50.811 RIGHT TESTICULAR PAIN: ICD-10-CM

## 2025-04-23 PROCEDURE — 76870 US EXAM SCROTUM: CPT | Mod: TC

## 2025-04-23 PROCEDURE — 76870 US EXAM SCROTUM: CPT | Mod: 26,,, | Performed by: RADIOLOGY

## 2025-04-28 ENCOUNTER — OFFICE VISIT (OUTPATIENT)
Dept: INTERNAL MEDICINE | Facility: CLINIC | Age: 50
End: 2025-04-28

## 2025-04-28 VITALS
HEART RATE: 80 BPM | DIASTOLIC BLOOD PRESSURE: 80 MMHG | HEIGHT: 73 IN | TEMPERATURE: 97 F | SYSTOLIC BLOOD PRESSURE: 132 MMHG | WEIGHT: 279.13 LBS | RESPIRATION RATE: 17 BRPM | OXYGEN SATURATION: 96 % | BODY MASS INDEX: 36.99 KG/M2
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
## 2025-04-28 DIAGNOSIS — E66.01 CLASS 2 SEVERE OBESITY DUE TO EXCESS CALORIES WITH SERIOUS COMORBIDITY AND BODY MASS INDEX (BMI) OF 39.0 TO 39.9 IN ADULT: ICD-10-CM

## 2025-04-28 DIAGNOSIS — Z12.11 COLON CANCER SCREENING: ICD-10-CM

## 2025-04-28 DIAGNOSIS — G43.009 MIGRAINE WITHOUT AURA AND WITHOUT STATUS MIGRAINOSUS, NOT INTRACTABLE: ICD-10-CM

## 2025-04-28 DIAGNOSIS — N20.0 CALCIUM OXALATE RENAL STONES: ICD-10-CM

## 2025-04-28 DIAGNOSIS — Z00.00 WELL ADULT EXAM: Primary | ICD-10-CM

## 2025-04-28 DIAGNOSIS — S46.811A STRAIN OF RIGHT TRAPEZIUS MUSCLE, INITIAL ENCOUNTER: ICD-10-CM

## 2025-04-28 DIAGNOSIS — E66.812 CLASS 2 SEVERE OBESITY DUE TO EXCESS CALORIES WITH SERIOUS COMORBIDITY AND BODY MASS INDEX (BMI) OF 39.0 TO 39.9 IN ADULT: ICD-10-CM

## 2025-04-28 PROCEDURE — 99999 PR PBB SHADOW E&M-EST. PATIENT-LVL V: CPT | Mod: PBBFAC,,, | Performed by: PEDIATRICS

## 2025-04-28 PROCEDURE — 99215 OFFICE O/P EST HI 40 MIN: CPT | Mod: PBBFAC | Performed by: PEDIATRICS

## 2025-04-28 RX ORDER — PROPRANOLOL HYDROCHLORIDE 60 MG/1
60 CAPSULE, EXTENDED RELEASE ORAL DAILY
Qty: 90 CAPSULE | Refills: 3 | Status: SHIPPED | OUTPATIENT
Start: 2025-04-28

## 2025-04-28 RX ORDER — MELOXICAM 15 MG/1
15 TABLET ORAL DAILY
Qty: 30 TABLET | Refills: 1 | Status: SHIPPED | OUTPATIENT
Start: 2025-04-28

## 2025-04-28 RX ORDER — TIZANIDINE 4 MG/1
4 TABLET ORAL EVERY 6 HOURS PRN
Qty: 60 TABLET | Refills: 0 | Status: SHIPPED | OUTPATIENT
Start: 2025-04-28 | End: 2025-05-08
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
## 2025-04-29 ENCOUNTER — LAB VISIT (OUTPATIENT)
Dept: LAB | Facility: HOSPITAL | Age: 50
End: 2025-04-29
Attending: PEDIATRICS

## 2025-04-29 DIAGNOSIS — Z00.00 WELL ADULT EXAM: ICD-10-CM

## 2025-04-29 LAB
ABSOLUTE EOSINOPHIL (OHS): 0.15 K/UL
ABSOLUTE MONOCYTE (OHS): 0.53 K/UL (ref 0.3–1)
ABSOLUTE NEUTROPHIL COUNT (OHS): 3.38 K/UL (ref 1.8–7.7)
ALBUMIN SERPL BCP-MCNC: 4 G/DL (ref 3.5–5.2)
ALP SERPL-CCNC: 61 UNIT/L (ref 40–150)
ALT SERPL W/O P-5'-P-CCNC: 21 UNIT/L (ref 10–44)
ANION GAP (OHS): 6 MMOL/L (ref 8–16)
AST SERPL-CCNC: 19 UNIT/L (ref 11–45)
BASOPHILS # BLD AUTO: 0.07 K/UL
BASOPHILS NFR BLD AUTO: 1.2 %
BILIRUB SERPL-MCNC: 0.8 MG/DL (ref 0.1–1)
BUN SERPL-MCNC: 18 MG/DL (ref 6–20)
CALCIUM SERPL-MCNC: 8.9 MG/DL (ref 8.7–10.5)
CHLORIDE SERPL-SCNC: 104 MMOL/L (ref 95–110)
CHOLEST SERPL-MCNC: 194 MG/DL (ref 120–199)
CHOLEST/HDLC SERPL: 4.5 {RATIO} (ref 2–5)
CO2 SERPL-SCNC: 27 MMOL/L (ref 23–29)
CREAT SERPL-MCNC: 1 MG/DL (ref 0.5–1.4)
EAG (OHS): 103 MG/DL (ref 68–131)
ERYTHROCYTE [DISTWIDTH] IN BLOOD BY AUTOMATED COUNT: 12.1 % (ref 11.5–14.5)
GFR SERPLBLD CREATININE-BSD FMLA CKD-EPI: >60 ML/MIN/1.73/M2
GLUCOSE SERPL-MCNC: 99 MG/DL (ref 70–110)
HBA1C MFR BLD: 5.2 % (ref 4–5.6)
HCT VFR BLD AUTO: 49.1 % (ref 40–54)
HDLC SERPL-MCNC: 43 MG/DL (ref 40–75)
HDLC SERPL: 22.2 % (ref 20–50)
HGB BLD-MCNC: 17.2 GM/DL (ref 14–18)
IMM GRANULOCYTES # BLD AUTO: 0.04 K/UL (ref 0–0.04)
IMM GRANULOCYTES NFR BLD AUTO: 0.7 % (ref 0–0.5)
LDLC SERPL CALC-MCNC: 125.2 MG/DL (ref 63–159)
LYMPHOCYTES # BLD AUTO: 1.81 K/UL (ref 1–4.8)
MCH RBC QN AUTO: 30.9 PG (ref 27–31)
MCHC RBC AUTO-ENTMCNC: 35 G/DL (ref 32–36)
MCV RBC AUTO: 88 FL (ref 82–98)
NONHDLC SERPL-MCNC: 151 MG/DL
NUCLEATED RBC (/100WBC) (OHS): 0 /100 WBC
PLATELET # BLD AUTO: 163 K/UL (ref 150–450)
PMV BLD AUTO: 10.6 FL (ref 9.2–12.9)
POTASSIUM SERPL-SCNC: 4.1 MMOL/L (ref 3.5–5.1)
PROT SERPL-MCNC: 7.2 GM/DL (ref 6–8.4)
RBC # BLD AUTO: 5.56 M/UL (ref 4.6–6.2)
RELATIVE EOSINOPHIL (OHS): 2.5 %
RELATIVE LYMPHOCYTE (OHS): 30.3 % (ref 18–48)
RELATIVE MONOCYTE (OHS): 8.9 % (ref 4–15)
RELATIVE NEUTROPHIL (OHS): 56.4 % (ref 38–73)
SODIUM SERPL-SCNC: 137 MMOL/L (ref 136–145)
TRIGL SERPL-MCNC: 129 MG/DL (ref 30–150)
WBC # BLD AUTO: 5.98 K/UL (ref 3.9–12.7)

## 2025-04-29 PROCEDURE — 80053 COMPREHEN METABOLIC PANEL: CPT

## 2025-04-29 PROCEDURE — 83036 HEMOGLOBIN GLYCOSYLATED A1C: CPT

## 2025-04-29 PROCEDURE — 85025 COMPLETE CBC W/AUTO DIFF WBC: CPT

## 2025-04-29 PROCEDURE — 36415 COLL VENOUS BLD VENIPUNCTURE: CPT | Mod: PO

## 2025-04-29 PROCEDURE — 80061 LIPID PANEL: CPT

## 2025-04-30 ENCOUNTER — RESULTS FOLLOW-UP (OUTPATIENT)
Dept: INTERNAL MEDICINE | Facility: CLINIC | Age: 50
End: 2025-04-30

## 2025-06-17 ENCOUNTER — TELEPHONE (OUTPATIENT)
Dept: INTERNAL MEDICINE | Facility: CLINIC | Age: 50
End: 2025-06-17
Payer: COMMERCIAL

## 2025-06-17 NOTE — TELEPHONE ENCOUNTER
Copied from CRM #[id number]. Topic: General Inquiry - Patient Advice  >> Jun 17, 2025 11:36 AM Nuha wrote:  Type: Request a call back    Name of Caller: pt  Best Call Back Number: [phone]   Additional Information:  pt is needing the nurse to call him back about a sleep study he had done outside of [facility] and wants to know how he can get to have a doctor review results.  Please call pt back to advise if dr. Munoz can do this.